# Patient Record
Sex: MALE | Race: WHITE | Employment: FULL TIME | URBAN - NONMETROPOLITAN AREA
[De-identification: names, ages, dates, MRNs, and addresses within clinical notes are randomized per-mention and may not be internally consistent; named-entity substitution may affect disease eponyms.]

---

## 2020-03-06 ENCOUNTER — OFFICE VISIT (OUTPATIENT)
Dept: FAMILY MEDICINE CLINIC | Age: 55
End: 2020-03-06
Payer: COMMERCIAL

## 2020-03-06 VITALS
SYSTOLIC BLOOD PRESSURE: 136 MMHG | DIASTOLIC BLOOD PRESSURE: 86 MMHG | WEIGHT: 254 LBS | OXYGEN SATURATION: 98 % | HEIGHT: 72 IN | HEART RATE: 80 BPM | BODY MASS INDEX: 34.4 KG/M2

## 2020-03-06 PROCEDURE — 99203 OFFICE O/P NEW LOW 30 MIN: CPT | Performed by: FAMILY MEDICINE

## 2020-03-06 RX ORDER — MELOXICAM 15 MG/1
15 TABLET ORAL DAILY
COMMUNITY
End: 2020-10-03 | Stop reason: SDUPTHER

## 2020-03-06 RX ORDER — LANOLIN ALCOHOL/MO/W.PET/CERES
1000 CREAM (GRAM) TOPICAL DAILY
COMMUNITY
End: 2022-03-17

## 2020-03-06 RX ORDER — MULTIVIT-MIN/IRON/FOLIC ACID/K 18-600-40
CAPSULE ORAL
COMMUNITY
End: 2020-03-06 | Stop reason: ALTCHOICE

## 2020-03-06 ASSESSMENT — PATIENT HEALTH QUESTIONNAIRE - PHQ9
SUM OF ALL RESPONSES TO PHQ QUESTIONS 1-9: 0
1. LITTLE INTEREST OR PLEASURE IN DOING THINGS: 0
2. FEELING DOWN, DEPRESSED OR HOPELESS: 0
SUM OF ALL RESPONSES TO PHQ9 QUESTIONS 1 & 2: 0
SUM OF ALL RESPONSES TO PHQ QUESTIONS 1-9: 0

## 2020-03-06 ASSESSMENT — ENCOUNTER SYMPTOMS
RHINORRHEA: 0
VOMITING: 0
RECTAL PAIN: 1
DIARRHEA: 0
COUGH: 0
COLOR CHANGE: 0
SHORTNESS OF BREATH: 0
NAUSEA: 0
CONSTIPATION: 0
EYE REDNESS: 0

## 2020-03-06 NOTE — PROGRESS NOTES
Liyah Arenas at [facility] called stating that they cannot find the Lidocaine Hydrocortisone 1 1% cream. Neither can [facility].   approved switching to the Proctazone cream.

## 2020-03-06 NOTE — PROGRESS NOTES
[address] [place] [address] Rd, [address], [place]  Phone:  [phone]  Fax:  [phone]         Name: Katie Lakhani  : 1965         Chief Complaint:     Chief Complaint   Patient presents with    New Patient     hemorrhiods    seen Dr Christian Torres        History of Present Illness:     Perfecto Grigsby is a 52 yo male who presents today to establish as a new patient for evaluation of rectal discomfort. Earlier this week he was doing crunches while on his inversion table and after he felt rectal pain. He has a history of internal hemorrhoids his last colonoscopy was in  with Dr. Poli Kendall. His bowels have been moving normally this week. No blood in or on his stools. No abdominal pain, nausea, vomiting, or diarrhea. Past Medical History:     Past Medical History:   Diagnosis Date    Chronic back pain     Internal hemorrhoids 2015    per colonoscopy        Past Surgical History:     Past Surgical History:   Procedure Laterality Date    CERVICAL FUSION      SKULL FRACTURE ELEVATION  1997    plate placed        Family History:     Family History   Problem Relation Age of Onset    Heart Attack Father     Heart Disease Father        Social History:     Social:    Social History     Socioeconomic History    Marital status:      Spouse name: Davi Neri Number of children: Not on file    Years of education: Not on file    Highest education level: Not on file   Occupational History    Not on file   Social Needs    Financial resource strain: Not on file    Food insecurity:     Worry: Not on file     Inability: Not on file    Transportation needs:     Medical: Not on file     Non-medical: Not on file   Tobacco Use    Smoking status: Former Smoker     Last attempt to quit: 2/15/1994     Years since quittin.0    Smokeless tobacco: Never Used   Substance and Sexual Activity    Alcohol use:  Yes     Alcohol/week: 2.0 standard drinks     Types: 2 Cans of diagnosis and/or treatment. Thus, any decision to defer recommended testing is done so at Eliana Sincere own risk. I would like to see Katie Lakhani back in Return in about 1 year (around 3/6/2021) for well/preventative visit. or sooner if any new issues occur.     Electronically signed by Charity Tracey MD on 3/6/2020 at 2:17 PM

## 2020-03-06 NOTE — PATIENT INSTRUCTIONS
and have to limit fluids, talk with your doctor before you increase the amount of fluids you drink. · Use a stool softener that contains bran or psyllium. You can save money by buying bran or psyllium (available in bulk at most health food stores) and sprinkling it on foods or stirring it into fruit juice. Or you can use a product such as Metamucil or Hydrocil. · Practice healthy bowel habits. ? Go to the bathroom as soon as you have the urge. ? Avoid straining to pass stools. Relax and give yourself time to let things happen naturally. ? Do not hold your breath while passing stools. ? Do not read while sitting on the toilet. Get off the toilet as soon as you have finished. · Take your medicines exactly as prescribed. Call your doctor if you think you are having a problem with your medicine. When should you call for help? Call 911 anytime you think you may need emergency care. For example, call if:    · You pass maroon or very bloody stools.    Call your doctor now or seek immediate medical care if:    · You have increased pain.     · You have increased bleeding.    Watch closely for changes in your health, and be sure to contact your doctor if:    · Your symptoms have not improved after 3 or 4 days. Where can you learn more? Go to https://Splick.it.[employer]. org and sign in to your [facility] account. Enter X440 in the [facility] box to learn more about \"Hemorrhoids: Care Instructions. \"     If you do not have an account, please click on the \"Sign Up Now\" link. Current as of: August 11, 2019  Content Version: 12.3  © 5368-7436 Healthwise, Incorporated. Care instructions adapted under license by [facility] ([facility]). If you have questions about a medical condition or this instruction, always ask your healthcare professional. [address] any warranty or liability for your use of this information.

## 2020-09-08 ENCOUNTER — TELEPHONE (OUTPATIENT)
Dept: FAMILY MEDICINE CLINIC | Age: 55
End: 2020-09-08

## 2020-09-10 LAB — SARS-COV-2: NOT DETECTED

## 2020-09-11 ENCOUNTER — TELEPHONE (OUTPATIENT)
Dept: FAMILY MEDICINE CLINIC | Age: 55
End: 2020-09-11

## 2020-09-11 NOTE — TELEPHONE ENCOUNTER
----- Message from Holley Rachel MD sent at 9/11/2020  8:08 AM EDT -----  COVID test was negative. Because of his wife's positive test, he should still quarantine for 14 days as he still may develop the virus.

## 2020-09-14 ENCOUNTER — TELEPHONE (OUTPATIENT)
Dept: FAMILY MEDICINE CLINIC | Age: 55
End: 2020-09-14

## 2020-09-14 NOTE — TELEPHONE ENCOUNTER
Patient called in and stated wife was DX covid on 09/30/2020. He was told to quarantine for 14 days. He tested negative and still no symptoms wife is better. He is asking if he can get a note to go back on 09/17/2020. And also asking if he needs to call Wednesday with updates on if he is having any symptoms. We are to call Francisco Hunt back.

## 2020-09-14 NOTE — TELEPHONE ENCOUNTER
I saw Zeny Martinez on 9/4/2020 and she reported her symptoms began 2 days prior. The end of her 10 day quarantine would be 9/12. Because she could spread the virus the entire 10 days and he was around her the whole time, his 14 day quarantine started on 9/12 so he should remain out of work until then as he can actually test positive any time bw 2-14 days.

## 2020-09-14 NOTE — TELEPHONE ENCOUNTER
Spoke with Vaishnavi Hall- directive given. Upset that he should still have to quarantine because he states that he has been only been with her since she started quarantining before labor day and that if he was going to get it he would have gotten it by now. Does not feel that his start date for his 14 day quarantine should have been later than that. Tried to explain that he could test positive as Dr. Kathryn Martin advised but he would not listen to me.  Please advise

## 2020-10-02 ENCOUNTER — TELEPHONE (OUTPATIENT)
Dept: FAMILY MEDICINE CLINIC | Age: 55
End: 2020-10-02

## 2020-10-03 RX ORDER — MELOXICAM 15 MG/1
15 TABLET ORAL DAILY
Qty: 90 TABLET | Refills: 1 | Status: SHIPPED | OUTPATIENT
Start: 2020-10-03 | End: 2021-09-12 | Stop reason: SDUPTHER

## 2021-05-06 ENCOUNTER — TELEPHONE (OUTPATIENT)
Dept: FAMILY MEDICINE CLINIC | Age: 56
End: 2021-05-06

## 2021-05-06 DIAGNOSIS — Z00.00 WELLNESS EXAMINATION: Primary | ICD-10-CM

## 2021-05-06 NOTE — TELEPHONE ENCOUNTER
----- Message from Stefan Burkitt sent at 5/6/2021 10:26 AM EDT -----  Subject: Message to Provider    QUESTIONS  Information for Provider? Patient is calling to request bloodwork prior to   patients appt on 5/18/21 . .. so he and dr. Glenroy Roland can go over them if   possible . . please advise   ---------------------------------------------------------------------------  --------------  CALL BACK INFO  What is the best way for the office to contact you? OK to leave message on   voicemail  Preferred Call Back Phone Number? [phone]  ---------------------------------------------------------------------------  --------------  SCRIPT ANSWERS  Relationship to Patient?  Self

## 2021-05-18 ENCOUNTER — NURSE ONLY (OUTPATIENT)
Dept: LAB | Age: 56
End: 2021-05-18

## 2021-05-18 ENCOUNTER — TELEPHONE (OUTPATIENT)
Dept: FAMILY MEDICINE CLINIC | Age: 56
End: 2021-05-18

## 2021-05-18 DIAGNOSIS — Z00.00 WELLNESS EXAMINATION: ICD-10-CM

## 2021-05-18 LAB
ANION GAP SERPL CALCULATED.3IONS-SCNC: 8 MEQ/L (ref 8–16)
BASOPHILS # BLD: 0.5 %
BASOPHILS ABSOLUTE: 0 THOU/MM3 (ref 0–0.1)
BUN BLDV-MCNC: 15 MG/DL (ref 7–22)
CALCIUM SERPL-MCNC: 10 MG/DL (ref 8.5–10.5)
CHLORIDE BLD-SCNC: 102 MEQ/L (ref 98–111)
CHOLESTEROL, TOTAL: 188 MG/DL (ref 100–199)
CO2: 29 MEQ/L (ref 23–33)
CREAT SERPL-MCNC: 0.9 MG/DL (ref 0.4–1.2)
EOSINOPHIL # BLD: 5.9 %
EOSINOPHILS ABSOLUTE: 0.3 THOU/MM3 (ref 0–0.4)
ERYTHROCYTE [DISTWIDTH] IN BLOOD BY AUTOMATED COUNT: 14.8 % (ref 11.5–14.5)
ERYTHROCYTE [DISTWIDTH] IN BLOOD BY AUTOMATED COUNT: 47 FL (ref 35–45)
GFR SERPL CREATININE-BSD FRML MDRD: 87 ML/MIN/1.73M2
GLUCOSE BLD-MCNC: 106 MG/DL (ref 70–108)
HCT VFR BLD CALC: 48.8 % (ref 42–52)
HDLC SERPL-MCNC: 55 MG/DL
HEMOGLOBIN: 15.9 GM/DL (ref 14–18)
IMMATURE GRANS (ABS): 0.02 THOU/MM3 (ref 0–0.07)
IMMATURE GRANULOCYTES: 0.3 %
LDL CHOLESTEROL CALCULATED: 112 MG/DL
LYMPHOCYTES # BLD: 21.1 %
LYMPHOCYTES ABSOLUTE: 1.2 THOU/MM3 (ref 1–4.8)
MCH RBC QN AUTO: 28.2 PG (ref 26–33)
MCHC RBC AUTO-ENTMCNC: 32.6 GM/DL (ref 32.2–35.5)
MCV RBC AUTO: 86.5 FL (ref 80–94)
MONOCYTES # BLD: 16.6 %
MONOCYTES ABSOLUTE: 1 THOU/MM3 (ref 0.4–1.3)
NUCLEATED RED BLOOD CELLS: 0 /100 WBC
PLATELET # BLD: 206 THOU/MM3 (ref 130–400)
PMV BLD AUTO: 9.5 FL (ref 9.4–12.4)
POTASSIUM SERPL-SCNC: 4.8 MEQ/L (ref 3.5–5.2)
PROSTATE SPECIFIC ANTIGEN: 0.46 NG/ML (ref 0–1)
RBC # BLD: 5.64 MILL/MM3 (ref 4.7–6.1)
SEG NEUTROPHILS: 55.6 %
SEGMENTED NEUTROPHILS ABSOLUTE COUNT: 3.3 THOU/MM3 (ref 1.8–7.7)
SODIUM BLD-SCNC: 139 MEQ/L (ref 135–145)
TRIGL SERPL-MCNC: 103 MG/DL (ref 0–199)
WBC # BLD: 5.9 THOU/MM3 (ref 4.8–10.8)

## 2021-05-20 ASSESSMENT — ENCOUNTER SYMPTOMS
EYE REDNESS: 0
COLOR CHANGE: 0
RHINORRHEA: 0
VOMITING: 0
SHORTNESS OF BREATH: 0
COUGH: 0
SORE THROAT: 0
NAUSEA: 0

## 2021-05-20 NOTE — PROGRESS NOTES
[address] [place] [address] Rd, [address], [place]  Phone:  [phone]  Fax:  [phone]      Marivel Díazdavonte       Name: Valdez Cortez  : 1965          Chief Complaint:     Chief Complaint   Patient presents with   [employer] [facility]     see labs done       History of Present Illness:      Valdez Cortez is a 61 y.o.  male who presents today for a health maintenance examination. He has been doing well overall. When he had his right knee replacement in January he was on Celebrex and it helped more than his Mobic. Lab Results   Component Value Date    CHOL 188 2021    TRIG 103 2021    HDL 55 2021    LDLCALC 112 2021     Lab Results   Component Value Date    ALT 34 2013    AST 34 2013        Lab Results   Component Value Date     2021    K 4.8 2021     2021    CO2 29 2021    BUN 15 2021    CREATININE 0.9 2021    GLUCOSE 106 2021    CALCIUM 10.0 2021        Health Maintenance  Smoker?:  No  Healthy diet?:  No  Routine exercise?:  Yes  Routine dental exams?:  Yes  Routine eye exams?:  No  Last colon cancer screening:  Colonoscopy with Dr. Chio Dawn       Past Medical History:     Past Medical History:   Diagnosis Date    Chronic back pain     Internal hemorrhoids 2015    per colonoscopy        Past Surgical History:     Past Surgical History:   Procedure Laterality Date    CERVICAL FUSION      SKULL FRACTURE ELEVATION      plate placed        Medications:       Outpatient Medications Prior to Visit   Medication Sig Dispense Refill    Coenzyme Q10 (CO Q 10) 100 MG CAPS Take by mouth      vitamin B-12 (CYANOCOBALAMIN) 1000 MCG tablet Take 1,000 mcg by mouth daily      Multiple Vitamin (MULTI VITAMIN MENS PO) Take  by mouth.  Omega-3 Fatty Acids (FISH OIL) 1200 MG CAPS Take  by mouth daily.         meloxicam (MOBIC) 15 MG tablet Take 1 tablet by mouth daily 90 tablet 1    GLUCOSA-CHONDR-NA CHONDR-MSM PO Take by mouth      hydrocortisone (PROCTOZONE-HC) 2.5 % rectal cream Place rectally 3 times daily as needed for Hemorrhoids       No facility-administered medications prior to visit. Allergies:       Patient has no known allergies. Social History:     Social:          Social History     Socioeconomic History    Marital status:      Spouse name: Elvia Franks Number of children: Not on file    Years of education: Not on file    Highest education level: Not on file   Occupational History    Not on file   Tobacco Use    Smoking status: Former Smoker     Quit date: 2/15/1994     Years since quittin.2    Smokeless tobacco: Never Used   Substance and Sexual Activity    Alcohol use: Yes     Alcohol/week: 2.0 standard drinks     Types: 2 Cans of beer per week     Comment: social    Drug use: No    Sexual activity: Yes     Partners: Female   Other Topics Concern    Not on file   Social History Narrative    Not on file     Social Determinants of Health     Financial Resource Strain:     Difficulty of Paying Living Expenses:    Food Insecurity:     Worried About Running Out of Food in the Last Year:     [address] in the Last Year:    Transportation Needs:     Lack of Transportation (Medical):      Lack of Transportation (Non-Medical):    Physical Activity:     Days of Exercise per Week:     Minutes of Exercise per Session:    Stress:     Feeling of Stress :    Social Connections:     Frequency of Communication with Friends and Family:     Frequency of Social Gatherings with Friends and Family:     Attends Orthodoxy Services:     Active Member of Clubs or Organizations:     Attends Club or Organization Meetings:     Marital Status:    Intimate Partner Violence:     Fear of Current or Ex-Partner:     Emotionally Abused:     Physically Abused:     Sexually Abused:        Family History:     Family History   Problem Relation Age of Onset    Heart Attack Father     Heart Disease Father        Review of Systems:     Review of Systems   Constitutional: Negative for chills and fever. HENT: Negative for rhinorrhea and sore throat. Eyes: Negative for redness and visual disturbance. Respiratory: Negative for cough and shortness of breath. Cardiovascular: Negative for chest pain and palpitations. Gastrointestinal: Negative for nausea and vomiting. Musculoskeletal: Negative for arthralgias and myalgias. Skin: Negative for color change and rash. Neurological: Negative for syncope and headaches. Psychiatric/Behavioral: Negative for dysphoric mood. The patient is not nervous/anxious. Physical Exam:     Vitals:  Blood pressure 132/64, pulse 74, temperature 98.5 °F (36.9 °C), height 6' (1.829 m), weight 257 lb (116.6 kg), SpO2 99 %. General appearance:  Alert, well appearing, and in no acute distress. Mental status:  Oriented to person, place, and time with normal affect. Head:  Normocephalic and atraumatic. Eyes:   Extraocular eye movements intact, conjunctiva clear. Ears:  Hearing appears to be intact. Nose:  No drainage noted. Mouth:  Mucous membranes moist.  Neck:  Supple, no carotid bruits, thyroid not palpable. Heart:  Normal rate, regular rhythm, no murmurs. Lungs:  Clear to auscultation bilaterally. Respirations unlabored. Abdomen:  Soft, nondistended, bowel sounds present all four quadrants. Neurological:  Normal speech. Extremities:  Peripheral pulses palpable, no pedal edema. Skin:  No gross lesions, rashes, or induration noted. Assessment:      Diagnosis Orders   1. Wellness examination     2.  Primary osteoarthritis involving multiple joints  celecoxib (CELEBREX) 100 MG capsule       Plan:     Orders Placed This Encounter   Medications    celecoxib (CELEBREX) 100 MG capsule     Sig: Take 1 capsule by mouth daily     Dispense:  90 capsule     Refill:  1     Counseling Completed:  Tobacco and secondary smoke risks reviewed; instructed on cessation and avoidance. Colon cancer screening reviewed age > 48, or < if indicated. Labs ordered, if indicated. Influenza vaccine recommended, if in season. Pneumonia vaccination if > 65 or indicated. Routine eye and dental exams advised. Exercise and healthy diet discussed. Return in about 1 year (around 5/24/2022) for well/preventative visit.     Electronically signed by Gracia Gruber MD on 5/24/2021 at 3:27 PM

## 2021-05-24 ENCOUNTER — OFFICE VISIT (OUTPATIENT)
Dept: FAMILY MEDICINE CLINIC | Age: 56
End: 2021-05-24
Payer: COMMERCIAL

## 2021-05-24 VITALS
BODY MASS INDEX: 34.81 KG/M2 | HEIGHT: 72 IN | SYSTOLIC BLOOD PRESSURE: 132 MMHG | WEIGHT: 257 LBS | OXYGEN SATURATION: 99 % | HEART RATE: 74 BPM | TEMPERATURE: 98.5 F | DIASTOLIC BLOOD PRESSURE: 64 MMHG

## 2021-05-24 DIAGNOSIS — Z00.00 WELLNESS EXAMINATION: Primary | ICD-10-CM

## 2021-05-24 DIAGNOSIS — M15.9 PRIMARY OSTEOARTHRITIS INVOLVING MULTIPLE JOINTS: ICD-10-CM

## 2021-05-24 PROCEDURE — 99396 PREV VISIT EST AGE 40-64: CPT | Performed by: FAMILY MEDICINE

## 2021-05-24 RX ORDER — CELECOXIB 100 MG/1
100 CAPSULE ORAL DAILY
Qty: 90 CAPSULE | Refills: 1 | Status: SHIPPED | OUTPATIENT
Start: 2021-05-24 | End: 2021-12-26

## 2021-05-24 ASSESSMENT — PATIENT HEALTH QUESTIONNAIRE - PHQ9
SUM OF ALL RESPONSES TO PHQ QUESTIONS 1-9: 0
2. FEELING DOWN, DEPRESSED OR HOPELESS: 0

## 2021-09-10 NOTE — TELEPHONE ENCOUNTER
----- Message from Arleth Rinaldi sent at 9/10/2021  4:05 PM EDT -----  Subject: Refill Request    QUESTIONS  Name of Medication? Other - meloxicam 15mg  Patient-reported dosage and instructions? once daily  How many days do you have left? 7  Preferred Pharmacy? [facility] phone number (if available)? [phone]  ---------------------------------------------------------------------------  --------------  CALL BACK INFO  What is the best way for the office to contact you? OK to leave message on   voicemail  Preferred Call Back Phone Number?  [phone]

## 2021-09-10 NOTE — TELEPHONE ENCOUNTER
Parth Dukes called requesting a refill on the following medications:  Requested Prescriptions     Pending Prescriptions Disp Refills    meloxicam (MOBIC) 15 MG tablet 90 tablet 1     Sig: Take 1 tablet by mouth daily       Date of last visit: 5/24/2021  Date of next visit (if applicable):Visit date not found  Date of last refill: 10/03/20  Pharmacy Name: [facility] LPN

## 2021-09-12 RX ORDER — MELOXICAM 15 MG/1
15 TABLET ORAL DAILY
Qty: 90 TABLET | Refills: 1 | Status: SHIPPED | OUTPATIENT
Start: 2021-09-12 | End: 2022-03-29

## 2021-10-09 LAB
ALBUMIN SERPL-MCNC: 4.6 G/DL
ALP BLD-CCNC: 56 U/L
ALT SERPL-CCNC: 32 U/L
ANION GAP SERPL CALCULATED.3IONS-SCNC: 2.1 MMOL/L
AST SERPL-CCNC: 24 U/L
BASOPHILS ABSOLUTE: NORMAL
BASOPHILS RELATIVE PERCENT: 0.5 %
BILIRUB SERPL-MCNC: 0.9 MG/DL (ref 0.1–1.4)
BILIRUBIN DIRECT: 0.2 MG/DL
BUN BLDV-MCNC: 23 MG/DL
CALCIUM SERPL-MCNC: 9.2 MG/DL
CHLORIDE BLD-SCNC: 102 MMOL/L
CHOLESTEROL, TOTAL: 190 MG/DL
CHOLESTEROL/HDL RATIO: NORMAL
CO2: 29 MMOL/L
CREAT SERPL-MCNC: 0.9 MG/DL
EOSINOPHILS ABSOLUTE: 0.2 /ΜL
EOSINOPHILS RELATIVE PERCENT: 3.7 %
GFR CALCULATED: 87
GLUCOSE BLD-MCNC: 97 MG/DL
HCT VFR BLD CALC: 47.9 % (ref 41–53)
HDLC SERPL-MCNC: 48 MG/DL (ref 35–70)
HEMOGLOBIN: 16.1 G/DL (ref 13.5–17.5)
LDL CHOLESTEROL CALCULATED: 126 MG/DL (ref 0–160)
LYMPHOCYTES ABSOLUTE: 1.2 /ΜL
LYMPHOCYTES RELATIVE PERCENT: 22.3 %
MCH RBC QN AUTO: 29.9 PG
MCHC RBC AUTO-ENTMCNC: 33.6 G/DL
MCV RBC AUTO: 89.2 FL
MONOCYTES ABSOLUTE: 0.8 /ΜL
MONOCYTES RELATIVE PERCENT: 16.1 %
NEUTROPHILS ABSOLUTE: 3 /ΜL
NEUTROPHILS RELATIVE PERCENT: 57.4 %
NONHDLC SERPL-MCNC: NORMAL MG/DL
PHOSPHORUS: 2.8 MG/DL
PLATELET # BLD: 219 K/ΜL
PMV BLD AUTO: 8.2 FL
POTASSIUM SERPL-SCNC: 4.5 MMOL/L
PROSTATE SPECIFIC ANTIGEN: 0.22 NG/ML
RBC # BLD: 5.38 10^6/ΜL
SODIUM BLD-SCNC: 136 MMOL/L
TOTAL PROTEIN: 6.8
TRIGL SERPL-MCNC: 80 MG/DL
VLDLC SERPL CALC-MCNC: 16 MG/DL
WBC # BLD: 5.2 10^3/ML

## 2021-12-26 ENCOUNTER — APPOINTMENT (OUTPATIENT)
Dept: GENERAL RADIOLOGY | Age: 56
End: 2021-12-26
Payer: COMMERCIAL

## 2021-12-26 ENCOUNTER — HOSPITAL ENCOUNTER (EMERGENCY)
Age: 56
Discharge: HOME | End: 2021-12-26
Attending: EMERGENCY MEDICINE
Payer: COMMERCIAL

## 2021-12-26 VITALS
DIASTOLIC BLOOD PRESSURE: 85 MMHG | BODY MASS INDEX: 34.54 KG/M2 | RESPIRATION RATE: 17 BRPM | TEMPERATURE: 98 F | WEIGHT: 255 LBS | SYSTOLIC BLOOD PRESSURE: 155 MMHG | OXYGEN SATURATION: 100 % | HEIGHT: 72 IN | HEART RATE: 57 BPM

## 2021-12-26 DIAGNOSIS — I10 UNCONTROLLED HYPERTENSION: Primary | ICD-10-CM

## 2021-12-26 LAB
ALBUMIN SERPL-MCNC: 4 GM/DL (ref 3.4–5)
ALP BLD-CCNC: 62 U/L (ref 46–116)
ALT SERPL-CCNC: 49 U/L (ref 14–63)
ANION GAP: 8 MEQ/L (ref 8–16)
AST SERPL-CCNC: 31 U/L (ref 15–37)
BILIRUB SERPL-MCNC: 0.4 MG/DL (ref 0.2–1)
BUN BLDV-MCNC: 16 MG/DL (ref 7–18)
CHLORIDE BLD-SCNC: 104 MEQ/L (ref 98–107)
CO2: 30 MEQ/L (ref 21–32)
CREAT SERPL-MCNC: 1 MG/DL (ref 0.6–1.3)
DIFFERENTIAL, MANUAL: NORMAL
EOSINOPHILS RELATIVE PERCENT: 1 % (ref 0–4)
GFR, ESTIMATED: 82 ML/MIN/1.73M2
GLUCOSE BLD-MCNC: 98 MG/DL (ref 74–106)
HCT VFR BLD CALC: 45.8 % (ref 42–52)
HEMOGLOBIN: 14.9 GM/DL (ref 14–18)
LYMPHOCYTES # BLD: 30 % (ref 15–47)
MAGNESIUM: 2.1 MG/DL (ref 1.8–2.4)
MCH RBC QN AUTO: 30.8 PG (ref 27–31)
MCHC RBC AUTO-ENTMCNC: 32.5 GM/DL (ref 33–37)
MCV RBC AUTO: 94.9 FL (ref 80–94)
MONOCYTES: 8 % (ref 0–12)
PDW BLD-RTO: 12.3 % (ref 11.5–14.5)
PLATELET # BLD: 264 THOU/MM3 (ref 130–400)
PLATELET ESTIMATE: ADEQUATE
PMV BLD AUTO: 6.9 FL (ref 7.4–10.4)
POC CALCIUM: 8.8 MG/DL (ref 8.5–10.1)
POTASSIUM SERPL-SCNC: 4.5 MEQ/L (ref 3.5–5.1)
RBC # BLD: 4.83 MILL/MM3 (ref 4.7–6.1)
SEGS: 61 % (ref 43–75)
SODIUM BLD-SCNC: 142 MEQ/L (ref 136–145)
TOTAL PROTEIN: 7.3 GM/DL (ref 6.4–8.2)
TROPONIN I: < 0.017 NG/ML (ref 0.02–0.06)
WBC # BLD: 7.8 THOU/MM3 (ref 4.8–10.8)

## 2021-12-26 PROCEDURE — 6370000000 HC RX 637 (ALT 250 FOR IP): Performed by: EMERGENCY MEDICINE

## 2021-12-26 PROCEDURE — 99283 EMERGENCY DEPT VISIT LOW MDM: CPT

## 2021-12-26 PROCEDURE — 80053 COMPREHEN METABOLIC PANEL: CPT

## 2021-12-26 PROCEDURE — 93005 ELECTROCARDIOGRAM TRACING: CPT | Performed by: EMERGENCY MEDICINE

## 2021-12-26 PROCEDURE — 84484 ASSAY OF TROPONIN QUANT: CPT

## 2021-12-26 PROCEDURE — 71045 X-RAY EXAM CHEST 1 VIEW: CPT

## 2021-12-26 PROCEDURE — 83735 ASSAY OF MAGNESIUM: CPT

## 2021-12-26 PROCEDURE — 85025 COMPLETE CBC W/AUTO DIFF WBC: CPT

## 2021-12-26 RX ORDER — LISINOPRIL 20 MG/1
20 TABLET ORAL DAILY
Qty: 30 TABLET | Refills: 0 | Status: SHIPPED | OUTPATIENT
Start: 2021-12-26 | End: 2022-01-27 | Stop reason: SDUPTHER

## 2021-12-26 RX ORDER — CLONIDINE HYDROCHLORIDE 0.1 MG/1
0.1 TABLET ORAL ONCE
Status: COMPLETED | OUTPATIENT
Start: 2021-12-26 | End: 2021-12-26

## 2021-12-26 RX ADMIN — CLONIDINE HYDROCHLORIDE 0.1 MG: 0.1 TABLET ORAL at 19:20

## 2021-12-26 NOTE — ED TRIAGE NOTES
Pt with high BP since Friday. Denies HA, chest pain. Yet yesterday had some chest pressure. Now with tingling in left arm.

## 2021-12-27 ENCOUNTER — TELEPHONE (OUTPATIENT)
Dept: FAMILY MEDICINE CLINIC | Age: 56
End: 2021-12-27

## 2021-12-27 LAB
EKG ATRIAL RATE: 57 BPM
EKG P AXIS: 28 DEGREES
EKG P-R INTERVAL: 154 MS
EKG Q-T INTERVAL: 424 MS
EKG QRS DURATION: 86 MS
EKG QTC CALCULATION (BAZETT): 412 MS
EKG R AXIS: 14 DEGREES
EKG T AXIS: 19 DEGREES
EKG VENTRICULAR RATE: 57 BPM

## 2021-12-27 PROCEDURE — 93010 ELECTROCARDIOGRAM REPORT: CPT | Performed by: INTERNAL MEDICINE

## 2021-12-27 ASSESSMENT — ENCOUNTER SYMPTOMS
NAUSEA: 0
WHEEZING: 0
SORE THROAT: 0
DIARRHEA: 0
BACK PAIN: 1
BLURRED VISION: 0
COUGH: 0

## 2021-12-27 NOTE — ED PROVIDER NOTES
[facility]  eMERGENCY dEPARTMENT eNCOUnter             [address] COMPLAINT    Chief Complaint   Patient presents with    Hypertension     HTN x2 days. Nurses Notes reviewed and I agree except as noted in the HPI. HPI    Jalen Hamilton is a 61 y.o. male who presents for evaluation of high blood pressure. He has been seeing a chiropractor for low back pain, and was told for the last two days that his BP was high. He denies history of hypertension, and is on no medication for it. He has recently been eating more sugar, salt, and drinking more alcohol. He has also taken some \"keto\" diet pills. No headache, chest pain, occasional dyspnea. REVIEW OF SYSTEMS      Review of Systems   Constitutional: Negative for fever and malaise/fatigue. HENT: Negative for congestion and sore throat. Eyes: Negative for blurred vision. Respiratory: Negative for cough and wheezing. Cardiovascular: Negative for chest pain and palpitations. Gastrointestinal: Negative for diarrhea and nausea. Genitourinary: Negative for dysuria. Musculoskeletal: Positive for back pain. Neurological: Negative for dizziness, focal weakness and headaches. All other systems reviewed and are negative. PAST MEDICAL HISTORY     has a past medical history of Chronic back pain and Internal hemorrhoids. SURGICAL HISTORY     has a past surgical history that includes Skull fracture elevation (1997); cervical fusion; and joint replacement (Right, 01/2021).     CURRENT MEDICATIONS    Discharge Medication List as of 12/26/2021  8:27 PM      CONTINUE these medications which have NOT CHANGED    Details   Digestive Enzymes (DIGESTIVE ENZYME PO) Take by mouth dailyHistorical Med      meloxicam (MOBIC) 15 MG tablet Take 1 tablet by mouth daily, Disp-90 tablet, R-1Normal      Coenzyme Q10 (CO Q 10) 100 MG CAPS Take by mouthHistorical Med      vitamin B-12 (CYANOCOBALAMIN) 1000 MCG tablet Take 1,000 mcg by mouth dailyHistorical Med      Multiple Vitamin (MULTI VITAMIN MENS PO) Take  by mouth. Historical Med      Omega-3 Fatty Acids (FISH OIL) 1200 MG CAPS Take  by mouth daily. Historical Med             ALLERGIES    has No Known Allergies. FAMILY HISTORY    He indicated that his mother is . He indicated that his father is . He indicated that his sister is alive. He indicated that his brother is alive. family history includes Heart Attack in his father; Heart Disease in his father. SOCIAL HISTORY     reports that he quit smoking about 27 years ago. He has never used smokeless tobacco. He reports current alcohol use of about 2.0 standard drinks of alcohol per week. He reports that he does not use drugs. PHYSICAL EXAM       INITIAL VITALS: BP (!) 155/85   Pulse 57   Temp 98 °F (36.7 °C)   Resp 17   Ht 6' (1.829 m)   Wt 255 lb (115.7 kg)   SpO2 100%   BMI 34.58 kg/m²      Physical Exam  Vitals and nursing note reviewed. Constitutional:       General: He is not in acute distress. Appearance: He is not toxic-appearing. HENT:      Nose: Nose normal.      Mouth/Throat:      Mouth: Mucous membranes are moist.      Pharynx: No oropharyngeal exudate or posterior oropharyngeal erythema. Eyes:      Pupils: Pupils are equal, round, and reactive to light. Cardiovascular:      Rate and Rhythm: Normal rate and regular rhythm. Heart sounds: No murmur heard. Pulmonary:      Effort: Pulmonary effort is normal. No respiratory distress. Breath sounds: Normal breath sounds. No wheezing. Abdominal:      General: Bowel sounds are normal.      Palpations: Abdomen is soft. There is no mass. Tenderness: There is no abdominal tenderness. Musculoskeletal:         General: No swelling or tenderness. Cervical back: Neck supple. Lymphadenopathy:      Cervical: No cervical adenopathy. Skin:     General: Skin is warm and dry.    Neurological:

## 2021-12-27 NOTE — ED NOTES
Discharge teaching and instructions for condition explained to patient by Tatum Vega RN. AVS reviewed. Went over prescriptions with patient. Patient voiced understanding regarding prescriptions, follow up appointments and care of self at home. Pt discharged to home in stable condition per self.        Frank Ni RN  12/26/21 2035

## 2022-01-27 ENCOUNTER — OFFICE VISIT (OUTPATIENT)
Dept: FAMILY MEDICINE CLINIC | Age: 57
End: 2022-01-27
Payer: COMMERCIAL

## 2022-01-27 VITALS
HEART RATE: 62 BPM | WEIGHT: 250 LBS | RESPIRATION RATE: 17 BRPM | BODY MASS INDEX: 33.86 KG/M2 | DIASTOLIC BLOOD PRESSURE: 90 MMHG | SYSTOLIC BLOOD PRESSURE: 151 MMHG | HEIGHT: 72 IN | TEMPERATURE: 97.7 F | OXYGEN SATURATION: 94 %

## 2022-01-27 DIAGNOSIS — I10 ESSENTIAL HYPERTENSION: Primary | ICD-10-CM

## 2022-01-27 PROCEDURE — 99213 OFFICE O/P EST LOW 20 MIN: CPT | Performed by: FAMILY MEDICINE

## 2022-01-27 RX ORDER — LISINOPRIL 20 MG/1
20 TABLET ORAL DAILY
Qty: 90 TABLET | Refills: 1 | Status: SHIPPED | OUTPATIENT
Start: 2022-01-27 | End: 2022-02-18 | Stop reason: SINTOL

## 2022-01-27 SDOH — ECONOMIC STABILITY: FOOD INSECURITY: WITHIN THE PAST 12 MONTHS, THE FOOD YOU BOUGHT JUST DIDN'T LAST AND YOU DIDN'T HAVE MONEY TO GET MORE.: NEVER TRUE

## 2022-01-27 SDOH — ECONOMIC STABILITY: FOOD INSECURITY: WITHIN THE PAST 12 MONTHS, YOU WORRIED THAT YOUR FOOD WOULD RUN OUT BEFORE YOU GOT MONEY TO BUY MORE.: NEVER TRUE

## 2022-01-27 ASSESSMENT — ENCOUNTER SYMPTOMS
SHORTNESS OF BREATH: 0
NAUSEA: 0
VOMITING: 0
COUGH: 0

## 2022-01-27 ASSESSMENT — SOCIAL DETERMINANTS OF HEALTH (SDOH): HOW HARD IS IT FOR YOU TO PAY FOR THE VERY BASICS LIKE FOOD, HOUSING, MEDICAL CARE, AND HEATING?: NOT HARD AT ALL

## 2022-01-27 NOTE — PROGRESS NOTES
[address]  [address] [place] [address]  Phone:  [phone]          Name: Jacoby Babin  : 1965    Chief Complaint   Patient presents with    Follow-Up from Hospital       HPI:     Jacoby Babin is a 61 y.o. male who presents today for follow up of elevated blood pressure. He was seen in the ER on 21 with elevated BP. He had been at the chiropractor the preceding 2 days and was told his BP was elevated (200/120). His EKG was sinus bradycardia, otherwise WNL. Labs were overall unremarkable. He was discharged with an Rx for Lisinopril 20 mg daily. It caused a tickle in his throat. He hasn't had it in 2 days. BP at home has ranged from 120//89. He wants to get off his BP medication so is working on eating healthier, eating less, and drinking less alcohol. He's already dropped about 10 lbs. He'd like to get down to 230 lbs. Current Outpatient Medications:     Cholecalciferol (D3 PO), Take by mouth, Disp: , Rfl:     ZINC PO, Take by mouth daily, Disp: , Rfl:     lisinopril (PRINIVIL;ZESTRIL) 20 MG tablet, Take 1 tablet by mouth daily For blood pressure, Disp: 90 tablet, Rfl: 1    Digestive Enzymes (DIGESTIVE ENZYME PO), Take by mouth daily, Disp: , Rfl:     meloxicam (MOBIC) 15 MG tablet, Take 1 tablet by mouth daily, Disp: 90 tablet, Rfl: 1    Coenzyme Q10 (CO Q 10) 100 MG CAPS, Take by mouth, Disp: , Rfl:     Multiple Vitamin (MULTI VITAMIN MENS PO), Take  by mouth., Disp: , Rfl:     Omega-3 Fatty Acids (FISH OIL) 1200 MG CAPS, Take  by mouth daily. , Disp: , Rfl:     vitamin B-12 (CYANOCOBALAMIN) 1000 MCG tablet, Take 1,000 mcg by mouth daily (Patient not taking: Reported on 2022), Disp: , Rfl:     No Known Allergies    Subjective:      Review of Systems   Constitutional: Negative for chills and fever. Respiratory: Negative for cough and shortness of breath. Cardiovascular: Negative for chest pain and palpitations. Gastrointestinal: Negative for nausea and vomiting. Neurological: Negative for dizziness, light-headedness and headaches. Objective:     BP (!) 151/90 (Site: Left Upper Arm, Position: Sitting, Cuff Size: Large Adult)   Pulse 62   Temp 97.7 °F (36.5 °C) (Temporal)   Resp 17   Ht 6' (1.829 m)   Wt 250 lb (113.4 kg)   SpO2 94%   BMI 33.91 kg/m²     Physical Exam  Vitals and nursing note reviewed. Constitutional:       Appearance: He is well-developed. HENT:      Head: Normocephalic and atraumatic. Eyes:      Conjunctiva/sclera: Conjunctivae normal.   Cardiovascular:      Rate and Rhythm: Normal rate and regular rhythm. Heart sounds: Normal heart sounds. Pulmonary:      Effort: Pulmonary effort is normal. No respiratory distress. Breath sounds: Normal breath sounds. Abdominal:      General: Bowel sounds are normal.      Palpations: Abdomen is soft. Tenderness: There is no abdominal tenderness. Musculoskeletal:      Cervical back: Normal range of motion and neck supple. Skin:     General: Skin is warm and dry. Neurological:      Mental Status: He is alert and oriented to person, place, and time. Motor: No abnormal muscle tone. Assessment/Plan:     Radha Velazquez was seen today for follow-up from hospital.    Diagnoses and all orders for this visit:    Essential hypertension  -     Blood pressure is elevated but he's been out of his Lisinopril so will restart it. He would like to get off it eventually so will continue to work on weight loss with a healthy diet and increased physical activity. -     lisinopril (PRINIVIL;ZESTRIL) 20 MG tablet; Take 1 tablet by mouth daily For blood pressure      Return in about 3 months (around 4/27/2022) for hypertension.     Electronically signed by Nurys Arizmendi MD on 1/27/2022 at 4:35 PM

## 2022-02-18 ENCOUNTER — TELEPHONE (OUTPATIENT)
Dept: FAMILY MEDICINE CLINIC | Age: 57
End: 2022-02-18

## 2022-02-18 DIAGNOSIS — I10 ESSENTIAL HYPERTENSION: Primary | ICD-10-CM

## 2022-02-18 DIAGNOSIS — R05.9 COUGH: ICD-10-CM

## 2022-02-18 RX ORDER — HYDROCHLOROTHIAZIDE 12.5 MG/1
12.5 TABLET ORAL DAILY
Qty: 90 TABLET | Refills: 0 | Status: SHIPPED | OUTPATIENT
Start: 2022-02-18 | End: 2022-03-07 | Stop reason: SDUPTHER

## 2022-02-18 RX ORDER — AZITHROMYCIN 250 MG/1
TABLET, FILM COATED ORAL
Qty: 6 TABLET | Refills: 0 | Status: SHIPPED | OUTPATIENT
Start: 2022-02-18 | End: 2022-03-17

## 2022-02-18 NOTE — TELEPHONE ENCOUNTER
Pt has dry cough from lisinopril and now it has developed into more coughing and sinus drainage. Started yesterday. He is currently in [place] at his daughters house and thinks he might be allergic to them too. He will be there until Sunday.      [facility] phone

## 2022-02-18 NOTE — TELEPHONE ENCOUNTER
----- Message from Sinan Fofana sent at 2/18/2022  8:28 AM EST -----  Subject: Message to Provider    QUESTIONS  Information for Provider? pt would like dr to give him a call back when   possible, having a dry cough due to bp medicine  ---------------------------------------------------------------------------  --------------  CALL BACK INFO  What is the best way for the office to contact you? OK to leave message on   voicemail  Preferred Call Back Phone Number? [phone]  ---------------------------------------------------------------------------  --------------  SCRIPT ANSWERS  Relationship to Patient?  Self

## 2022-03-07 ENCOUNTER — TELEPHONE (OUTPATIENT)
Dept: FAMILY MEDICINE CLINIC | Age: 57
End: 2022-03-07

## 2022-03-07 DIAGNOSIS — I10 ESSENTIAL HYPERTENSION: ICD-10-CM

## 2022-03-07 RX ORDER — HYDROCHLOROTHIAZIDE 25 MG/1
25 TABLET ORAL DAILY
Qty: 30 TABLET | Refills: 1 | Status: SHIPPED | OUTPATIENT
Start: 2022-03-07 | End: 2022-03-17 | Stop reason: SDUPTHER

## 2022-03-07 NOTE — TELEPHONE ENCOUNTER
----- Message from Ruperto Osler sent at 3/4/2022  4:11 PM EST -----  Subject: Medication Problem    QUESTIONS  Name of Medication? hydroCHLOROthiazide (HYDRODIURIL) 12.5 MG tablet  Patient-reported dosage and instructions? 1 daily   What question or problem do you have with the medication? Pt is wondering   if he could take 2 daily instead of 1 because his BP has been high. Preferred Pharmacy? [facility] - [address] [facility], [address] [place] [address] [place] Lesley Zaidi [phone] - F [phone]  Pharmacy phone number (if available)? [phone]  Additional Information for Provider?   ---------------------------------------------------------------------------  --------------  [address]  What is the best way for the office to contact you? OK to leave message on   voicemail  Preferred Call Back Phone Number? [phone]  ---------------------------------------------------------------------------  --------------  SCRIPT ANSWERS  Relationship to Patient?  Self

## 2022-03-15 ASSESSMENT — ENCOUNTER SYMPTOMS
SHORTNESS OF BREATH: 0
COUGH: 0
VOMITING: 0
NAUSEA: 0

## 2022-03-15 NOTE — PROGRESS NOTES
[address]  [address] [place] [address]  Phone:  [phone]          Name: Calli Martinez  : 1965    Chief Complaint   Patient presents with    Blood Pressure Check       HPI:     Calli Martinez is a 61 y.o. male who presents today for follow up of hypertension. He was on Lisinopril and his BP was controlled, but he developed a cough but it was discontinued and he was started on HCTZ. Since that time, his BP has been elevated. Lab Results   Component Value Date     2021    K 4.5 2021     2021    CO2 30 2021    BUN 16 2021    CREATININE 1.0 2021    GLUCOSE 98 2021    CALCIUM 9.2 10/09/2021        Current Outpatient Medications:     vitamin B-12 (CYANOCOBALAMIN) 1000 MCG tablet, Take 1,000 mcg by mouth daily, Disp: , Rfl:     losartan (COZAAR) 25 MG tablet, Take 1 tablet by mouth daily, Disp: 30 tablet, Rfl: 0    hydroCHLOROthiazide (HYDRODIURIL) 25 MG tablet, Take 1 tablet by mouth daily, Disp: 90 tablet, Rfl: 1    Cholecalciferol (D3 PO), Take by mouth, Disp: , Rfl:     ZINC PO, Take by mouth daily, Disp: , Rfl:     Digestive Enzymes (DIGESTIVE ENZYME PO), Take by mouth daily, Disp: , Rfl:     meloxicam (MOBIC) 15 MG tablet, Take 1 tablet by mouth daily, Disp: 90 tablet, Rfl: 1    Coenzyme Q10 (CO Q 10) 100 MG CAPS, Take by mouth, Disp: , Rfl:     Multiple Vitamin (MULTI VITAMIN MENS PO), Take  by mouth., Disp: , Rfl:     Omega-3 Fatty Acids (FISH OIL) 1200 MG CAPS, Take  by mouth daily. , Disp: , Rfl:     No Known Allergies    Subjective:      Review of Systems   Constitutional: Negative for chills and fever. Respiratory: Negative for cough and shortness of breath. Cardiovascular: Negative for chest pain and palpitations. Gastrointestinal: Negative for nausea and vomiting. Neurological: Negative for dizziness, light-headedness and headaches.        Objective:     BP (!) 152/92 (Site: Left Upper Arm, Position: Sitting, Cuff Size: Medium Adult)   Pulse 67   Temp 97.7 °F (36.5 °C) (Temporal)   Resp 16   Ht 6' 0.01\" (1.829 m)   Wt 251 lb (113.9 kg)   SpO2 93%   BMI 34.03 kg/m²     Physical Exam  Vitals and nursing note reviewed. Constitutional:       Appearance: He is well-developed. HENT:      Head: Normocephalic and atraumatic. Eyes:      Conjunctiva/sclera: Conjunctivae normal.   Cardiovascular:      Rate and Rhythm: Normal rate and regular rhythm. Heart sounds: Normal heart sounds. Pulmonary:      Effort: Pulmonary effort is normal. No respiratory distress. Breath sounds: Normal breath sounds. Abdominal:      General: Bowel sounds are normal.      Palpations: Abdomen is soft. Tenderness: There is no abdominal tenderness. Musculoskeletal:      Cervical back: Normal range of motion and neck supple. Skin:     General: Skin is warm and dry. Neurological:      Mental Status: He is alert and oriented to person, place, and time. Motor: No abnormal muscle tone. Assessment/Plan:     Bonita Grant was seen today for blood pressure check. Diagnoses and all orders for this visit:    Essential hypertension  -      Blood pressure has been elevated so will add Losartan 25 mg daily to his HCTZ. He will call report of his BPs in 1 week. -      losartan (COZAAR) 25 MG tablet; Take 1 tablet by mouth daily      Return in about 6 months (around 9/17/2022) for hypertension; call report of BP in 1 week.     Electronically signed by Davina Gil MD on 3/17/2022 at 4:01 PM

## 2022-03-17 ENCOUNTER — OFFICE VISIT (OUTPATIENT)
Dept: FAMILY MEDICINE CLINIC | Age: 57
End: 2022-03-17
Payer: COMMERCIAL

## 2022-03-17 VITALS
BODY MASS INDEX: 34 KG/M2 | RESPIRATION RATE: 16 BRPM | HEART RATE: 67 BPM | DIASTOLIC BLOOD PRESSURE: 92 MMHG | TEMPERATURE: 97.7 F | HEIGHT: 72 IN | OXYGEN SATURATION: 93 % | SYSTOLIC BLOOD PRESSURE: 152 MMHG | WEIGHT: 251 LBS

## 2022-03-17 DIAGNOSIS — I10 ESSENTIAL HYPERTENSION: Primary | ICD-10-CM

## 2022-03-17 PROCEDURE — 99213 OFFICE O/P EST LOW 20 MIN: CPT | Performed by: FAMILY MEDICINE

## 2022-03-17 RX ORDER — LOSARTAN POTASSIUM 25 MG/1
25 TABLET ORAL DAILY
Qty: 30 TABLET | Refills: 0 | Status: SHIPPED | OUTPATIENT
Start: 2022-03-17 | End: 2022-04-12 | Stop reason: SDUPTHER

## 2022-03-17 RX ORDER — LANOLIN ALCOHOL/MO/W.PET/CERES
1000 CREAM (GRAM) TOPICAL DAILY
COMMUNITY

## 2022-03-17 RX ORDER — HYDROCHLOROTHIAZIDE 25 MG/1
25 TABLET ORAL DAILY
Qty: 90 TABLET | Refills: 1 | Status: SHIPPED | OUTPATIENT
Start: 2022-03-17 | End: 2022-05-17 | Stop reason: SDUPTHER

## 2022-03-29 RX ORDER — MELOXICAM 15 MG/1
15 TABLET ORAL DAILY
Qty: 90 TABLET | Refills: 0 | Status: SHIPPED | OUTPATIENT
Start: 2022-03-29 | End: 2022-11-02 | Stop reason: SDUPTHER

## 2022-04-11 ASSESSMENT — ENCOUNTER SYMPTOMS
SHORTNESS OF BREATH: 0
VOMITING: 0
NAUSEA: 0
COUGH: 0

## 2022-04-11 NOTE — PROGRESS NOTES
[address]  [address] [place] [address]  Phone:  [phone]          Name: Sheila Rojo  : 1965    Chief Complaint   Patient presents with    Other     HTN     Medication Check       HPI:     Sheila Rojo is a 61 y.o. male who presents today for follow up of hypertension. He was on Lisinopril and his BP was controlled, but he developed a cough but it was discontinued and he was started on HCTZ. At last check his BP was still elevated so he was started on Losartan 25 mg daily. He's been checking his BP at home and it was 152/87 in his left arm and 142/83 in his right arm, then another time 162/82 in the left and 147/77 in the right arm. Today's BPs were 156/88 in the left arm and 138/80 in the right arm. Lab Results   Component Value Date     2021    K 4.5 2021     2021    CO2 30 2021    BUN 16 2021    CREATININE 1.0 2021    GLUCOSE 98 2021    CALCIUM 9.2 10/09/2021        Current Outpatient Medications:     losartan (COZAAR) 50 MG tablet, Take 1 tablet by mouth daily, Disp: 90 tablet, Rfl: 0    meloxicam (MOBIC) 15 MG tablet, Take 1 tablet by mouth daily, Disp: 90 tablet, Rfl: 0    vitamin B-12 (CYANOCOBALAMIN) 1000 MCG tablet, Take 1,000 mcg by mouth daily, Disp: , Rfl:     hydroCHLOROthiazide (HYDRODIURIL) 25 MG tablet, Take 1 tablet by mouth daily, Disp: 90 tablet, Rfl: 1    Cholecalciferol (D3 PO), Take by mouth, Disp: , Rfl:     ZINC PO, Take by mouth daily, Disp: , Rfl:     Digestive Enzymes (DIGESTIVE ENZYME PO), Take by mouth daily, Disp: , Rfl:     Coenzyme Q10 (CO Q 10) 100 MG CAPS, Take by mouth, Disp: , Rfl:     Multiple Vitamin (MULTI VITAMIN MENS PO), Take  by mouth., Disp: , Rfl:     Omega-3 Fatty Acids (FISH OIL) 1200 MG CAPS, Take  by mouth daily. , Disp: , Rfl:     No Known Allergies    Subjective:      Review of Systems   Constitutional: Negative for chills and fever. Respiratory: Negative for cough and shortness of breath. Cardiovascular: Negative for chest pain and palpitations. Gastrointestinal: Negative for nausea and vomiting. Neurological: Negative for dizziness, light-headedness and headaches. Objective:     BP (!) 165/85 (Site: Left Upper Arm, Position: Sitting, Cuff Size: Medium Adult)   Pulse 69   Temp 97.1 °F (36.2 °C) (Temporal)   Resp 17   Ht 6' (1.829 m)   Wt 254 lb (115.2 kg)   SpO2 97%   BMI 34.45 kg/m²     Physical Exam  Vitals and nursing note reviewed. Constitutional:       Appearance: He is well-developed. HENT:      Head: Normocephalic and atraumatic. Eyes:      Conjunctiva/sclera: Conjunctivae normal.   Cardiovascular:      Rate and Rhythm: Normal rate and regular rhythm. Heart sounds: Normal heart sounds. Pulmonary:      Effort: Pulmonary effort is normal. No respiratory distress. Breath sounds: Normal breath sounds. Abdominal:      General: Bowel sounds are normal.      Palpations: Abdomen is soft. Tenderness: There is no abdominal tenderness. Musculoskeletal:      Cervical back: Normal range of motion and neck supple. Skin:     General: Skin is warm and dry. Neurological:      Mental Status: He is alert and oriented to person, place, and time. Motor: No abnormal muscle tone. Assessment/Plan:     Cole Valle was seen today for other and medication check. Diagnoses and all orders for this visit:    Essential hypertension  -     BP is elevated so will increase Losartan from 25 mg to 50 mg daily. -     losartan (COZAAR) 50 MG tablet; Take 1 tablet by mouth daily    Asymmetric blood pressures  -    Will check ABIs to evaluate for possible vascular cause of his asymmetric blood pressures. -    VL HARDIK BILATERAL LIMITED 1-2 LEVELS; Future      Return in about 2 weeks (around 4/26/2022) for hypertension.     Electronically signed by Chente Engle MD on 4/12/2022 at 3:59 PM

## 2022-04-12 ENCOUNTER — OFFICE VISIT (OUTPATIENT)
Dept: FAMILY MEDICINE CLINIC | Age: 57
End: 2022-04-12
Payer: COMMERCIAL

## 2022-04-12 VITALS
SYSTOLIC BLOOD PRESSURE: 165 MMHG | HEART RATE: 69 BPM | DIASTOLIC BLOOD PRESSURE: 85 MMHG | RESPIRATION RATE: 17 BRPM | BODY MASS INDEX: 34.4 KG/M2 | TEMPERATURE: 97.1 F | WEIGHT: 254 LBS | HEIGHT: 72 IN | OXYGEN SATURATION: 97 %

## 2022-04-12 DIAGNOSIS — I10 ESSENTIAL HYPERTENSION: Primary | ICD-10-CM

## 2022-04-12 DIAGNOSIS — I99.8 ASYMMETRIC BLOOD PRESSURES: ICD-10-CM

## 2022-04-12 DIAGNOSIS — I10 ESSENTIAL HYPERTENSION: ICD-10-CM

## 2022-04-12 PROCEDURE — 99214 OFFICE O/P EST MOD 30 MIN: CPT | Performed by: FAMILY MEDICINE

## 2022-04-12 RX ORDER — LOSARTAN POTASSIUM 50 MG/1
50 TABLET ORAL DAILY
Qty: 90 TABLET | Refills: 0 | Status: SHIPPED | OUTPATIENT
Start: 2022-04-12 | End: 2022-07-05

## 2022-04-12 RX ORDER — LOSARTAN POTASSIUM 25 MG/1
25 TABLET ORAL DAILY
Qty: 30 TABLET | Refills: 0 | OUTPATIENT
Start: 2022-04-12

## 2022-04-12 NOTE — PATIENT INSTRUCTIONS
Patient Education        Low Back Pain: Exercises  Introduction  Here are some examples of exercises for you to try. The exercises may be suggested for a condition or for rehabilitation. Start each exercise slowly. Ease off the exercises if you start to have pain. You will be told when to start these exercises and which ones will work bestfor you. How to do the exercises  Press-up    1. Lie on your stomach, supporting your body with your forearms. 2. Press your elbows down into the floor to raise your upper back. As you do this, relax your stomach muscles and allow your back to arch without using your back muscles. As your press up, do not let your hips or pelvis come off the floor. 3. Hold for 15 to 30 seconds, then relax. 4. Repeat 2 to 4 times. Alternate arm and leg (bird dog) exercise    Do this exercise slowly. Try to keep your body straight at all times, and donot let one hip drop lower than the other. 1. Start on the floor, on your hands and knees. 2. Tighten your belly muscles. 3. Raise one leg off the floor, and hold it straight out behind you. Be careful not to let your hip drop down, because that will twist your trunk. 4. Hold for about 6 seconds, then lower your leg and switch to the other leg. 5. Repeat 8 to 12 times on each leg. 6. Over time, work up to holding for 10 to 30 seconds each time. 7. If you feel stable and secure with your leg raised, try raising the opposite arm straight out in front of you at the same time. Knee-to-chest exercise    1. Lie on your back with your knees bent and your feet flat on the floor. 2. Bring one knee to your chest, keeping the other foot flat on the floor (or keeping the other leg straight, whichever feels better on your lower back). 3. Keep your lower back pressed to the floor. Hold for at least 15 to 30 seconds. 4. Relax, and lower the knee to the starting position. 5. Repeat with the other leg. Repeat 2 to 4 times with each leg.   6. To get more stretch, put your other leg flat on the floor while pulling your knee to your chest.  Curl-ups    1. Lie on the floor on your back with your knees bent at a 90-degree angle. Your feet should be flat on the floor, about 12 inches from your buttocks. 2. Cross your arms over your chest. If this bothers your neck, try putting your hands behind your neck (not your head), with your elbows spread apart. 3. Slowly tighten your belly muscles and raise your shoulder blades off the floor. 4. Keep your head in line with your body, and do not press your chin to your chest.  5. Hold this position for 1 or 2 seconds, then slowly lower yourself back down to the floor. 6. Repeat 8 to 12 times. Pelvic tilt exercise    1. Lie on your back with your knees bent. 2. \"Brace\" your stomach. This means to tighten your muscles by pulling in and imagining your belly button moving toward your spine. You should feel like your back is pressing to the floor and your hips and pelvis are rocking back. 3. Hold for about 6 seconds while you breathe smoothly. 4. Repeat 8 to 12 times. Heel dig bridging    1. Lie on your back with both knees bent and your ankles bent so that only your heels are digging into the floor. Your knees should be bent about 90 degrees. 2. Then push your heels into the floor, squeeze your buttocks, and lift your hips off the floor until your shoulders, hips, and knees are all in a straight line. 3. Hold for about 6 seconds as you continue to breathe normally, and then slowly lower your hips back down to the floor and rest for up to 10 seconds. 4. Do 8 to 12 repetitions. Hamstring stretch in doorway    1. Lie on your back in a doorway, with one leg through the open door. 2. Slide your leg up the wall to straighten your knee. You should feel a gentle stretch down the back of your leg. 3. Hold the stretch for at least 15 to 30 seconds. Do not arch your back, point your toes, or bend either knee.  Keep one heel touching the floor and the other heel touching the wall. 4. Repeat with your other leg. 5. Do 2 to 4 times for each leg. Hip flexor stretch    1. Kneel on the floor with one knee bent and one leg behind you. Place your forward knee over your foot. Keep your other knee touching the floor. 2. Slowly push your hips forward until you feel a stretch in the upper thigh of your rear leg. 3. Hold the stretch for at least 15 to 30 seconds. Repeat with your other leg. 4. Do 2 to 4 times on each side. Wall sit    1. Stand with your back 10 to 12 inches away from a wall. 2. Lean into the wall until your back is flat against it. 3. Slowly slide down until your knees are slightly bent, pressing your lower back into the wall. 4. Hold for about 6 seconds, then slide back up the wall. 5. Repeat 8 to 12 times. Follow-up care is a key part of your treatment and safety. Be sure to make and go to all appointments, and call your doctor if you are having problems. It's also a good idea to know your test results and keep alist of the medicines you take. Where can you learn more? Go to https://[employer]. org and sign in to your [email or web address] account. Enter F657 in the [employer] box to learn more about \"Low Back Pain: Exercises. \"     If you do not have an account, please click on the \"Sign Up Now\" link. Current as of: July 1, 2021               Content Version: 13.2  © 2006-2022 Healthwise, Incorporated. Care instructions adapted under license by [facility] ([facility]). If you have questions about a medical condition or this instruction, always ask your healthcare professional. [place] [address] any warranty or liability for your use of this information. Patient Education        Hip Arthritis: Exercises  Introduction  Here are some examples of exercises for you to try. The exercises may be suggested for a condition or for rehabilitation. Start each exercise slowly. Ease off the exercises if you 6 seconds as you continue to breathe normally, and then slowly lower your hips back down to the floor and rest for up to 10 seconds. 4. Repeat 8 to 12 times. Hamstring stretch (lying down)    1. Lie flat on your back with your legs straight. If you feel discomfort in your back, place a small towel roll under your lower back. 2. Holding the back of your affected leg, lift your leg straight up and toward your body until you feel a stretch at the back of your thigh. 3. Hold the stretch for at least 30 seconds. 4. Repeat 2 to 4 times. 5. Switch legs and repeat steps 1 through 4, even if only one hip is sore. Standing quadriceps stretch    1. If you are not steady on your feet, hold on to a chair, counter, or wall. You can also lie on your stomach or your side to do this exercise. 2. Bend the knee of the leg you want to stretch, and reach behind you to grab the front of your foot or ankle with the hand on the same side. For example, if you are stretching your right leg, use your right hand. 3. Keeping your knees next to each other, pull your foot toward your buttock until you feel a gentle stretch across the front of your hip and down the front of your thigh. Your knee should be pointed directly to the ground, and not out to the side. 4. Hold the stretch for at least 15 to 30 seconds. 5. Repeat 2 to 4 times. 6. Switch legs and repeat steps 1 through 5, even if only one hip is sore. Hip rotator stretch    1. Lie on your back with both knees bent and your feet flat on the floor. 2. Put the ankle of your affected leg on your opposite thigh near your knee. 3. Use your hand to gently push your knee away from your body until you feel a gentle stretch around your hip. 4. Hold the stretch for 15 to 30 seconds. 5. Repeat 2 to 4 times. 6. Repeat steps 1 through 5, but this time use your hand to gently pull your knee toward your opposite shoulder.   7. Switch legs and repeat steps 1 through 6, even if only one hip is sore. Knee-to-chest    1. Lie on your back with your knees bent and your feet flat on the floor. 2. Bring your affected leg to your chest, keeping the other foot flat on the floor (or keeping the other leg straight, whichever feels better on your lower back). 3. Keep your lower back pressed to the floor. Hold for at least 15 to 30 seconds. 4. Relax, and lower the knee to the starting position. 5. Repeat 2 to 4 times. 6. Switch legs and repeat steps 1 through 5, even if only one hip is sore. 7. To get more stretch, put your other leg flat on the floor while pulling your knee to your chest.  Clamshell    1. Lie on your side, with your affected hip on top. Keep your feet and knees together and your knees bent. 2. Raise your top knee, but keep your feet together. Do not let your hips roll back. Your legs should open up like a clamshell. 3. Hold for 6 seconds. 4. Slowly lower your knee back down. Rest for 10 seconds. 5. Repeat 8 to 12 times. 6. Switch legs and repeat steps 1 through 5, even if only one hip is sore. Follow-up care is a key part of your treatment and safety. Be sure to make and go to all appointments, and call your doctor if you are having problems. It's also a good idea to know your test results and keep alist of the medicines you take. Where can you learn more? Go to https://[email or web address]. org and sign in to your [employer] account. Enter H143 in the [facility] box to learn more about \"Hip Arthritis: Exercises. \"     If you do not have an account, please click on the \"Sign Up Now\" link. Current as of: July 1, 2021               Content Version: 13.2  © 2006-2022 Healthwise, Incorporated. Care instructions adapted under license by [facility] ([facility]). If you have questions about a medical condition or this instruction, always ask your healthcare professional. [place] [address] any warranty or liability for your use of this information.

## 2022-04-19 NOTE — PROGRESS NOTES
[address]  [address] [place] [address]  Phone:  [phone]          Name: Jammie Simon  : 1965    Chief Complaint   Patient presents with    Joint Swelling     left       HPI:     Jammie Simon is a 61 y.o. male who presents today for evaluation of a swollen left elbow. There was no inciting injury. He's noticed it since he began taking his BP more frequently. The elbow and just below the elbow are swollen and sore to touch. No increased redness. It's mildly warm. No fevers. Current Outpatient Medications:     cephALEXin (KEFLEX) 500 MG capsule, Take 1 capsule by mouth 2 times daily for 7 days, Disp: 14 capsule, Rfl: 0    predniSONE (DELTASONE) 10 MG tablet, Take 1 tablet by mouth daily Take 5 tabs daily for 3 days, then 4 tabs daily for 3 days, then 3 tabs daily for 3 days, then 2 tabs daily for 3 days, then 1 tab daily for 3 days. , Disp: 45 tablet, Rfl: 0    losartan (COZAAR) 50 MG tablet, Take 1 tablet by mouth daily, Disp: 90 tablet, Rfl: 0    meloxicam (MOBIC) 15 MG tablet, Take 1 tablet by mouth daily, Disp: 90 tablet, Rfl: 0    vitamin B-12 (CYANOCOBALAMIN) 1000 MCG tablet, Take 1,000 mcg by mouth daily, Disp: , Rfl:     hydroCHLOROthiazide (HYDRODIURIL) 25 MG tablet, Take 1 tablet by mouth daily, Disp: 90 tablet, Rfl: 1    Cholecalciferol (D3 PO), Take by mouth, Disp: , Rfl:     ZINC PO, Take by mouth daily, Disp: , Rfl:     Digestive Enzymes (DIGESTIVE ENZYME PO), Take by mouth daily, Disp: , Rfl:     Coenzyme Q10 (CO Q 10) 100 MG CAPS, Take by mouth, Disp: , Rfl:     Multiple Vitamin (MULTI VITAMIN MENS PO), Take  by mouth., Disp: , Rfl:     Omega-3 Fatty Acids (FISH OIL) 1200 MG CAPS, Take  by mouth daily. , Disp: , Rfl:     No Known Allergies    Subjective:      Review of Systems   Musculoskeletal: Positive for arthralgias and joint swelling. Neurological: Negative for weakness and numbness.        Objective:     /84 (Site: Left Upper Arm, Position: Sitting)   Pulse 63   Temp 98.7 °F (37.1 °C) (Temporal)   Resp 16   Wt 250 lb (113.4 kg)   SpO2 97%   BMI 33.91 kg/m²     Physical Exam  Constitutional:       Appearance: He is well-developed. HENT:      Head: Normocephalic and atraumatic. Eyes:      Conjunctiva/sclera: Conjunctivae normal.   Cardiovascular:      Rate and Rhythm: Normal rate and regular rhythm. Heart sounds: Normal heart sounds. Pulmonary:      Effort: Pulmonary effort is normal. No respiratory distress. Breath sounds: Normal breath sounds. Abdominal:      General: Bowel sounds are normal.      Palpations: Abdomen is soft. Tenderness: There is no abdominal tenderness. Musculoskeletal:      Right elbow: Normal.      Left elbow: Swelling present. Normal range of motion. Tenderness present in olecranon process. Cervical back: Normal range of motion and neck supple. Skin:     General: Skin is warm and dry. Neurological:      Mental Status: He is alert and oriented to person, place, and time. Motor: No abnormal muscle tone. Assessment/Plan:     Meng Tang was seen today for joint swelling. Diagnoses and all orders for this visit:    Olecranon bursitis of left elbow  -     A handout of exercises was provided. -     cephALEXin (KEFLEX) 500 MG capsule; Take 1 capsule by mouth 2 times daily for 7 days  -     predniSONE (DELTASONE) 10 MG tablet; Take 1 tablet by mouth daily Take 5 tabs daily for 3 days, then 4 tabs daily for 3 days, then 3 tabs daily for 3 days, then 2 tabs daily for 3 days, then 1 tab daily for 3 days. Return if symptoms worsen or fail to improve.     Electronically signed by Nick Claudio MD on 4/21/2022 at 4:11 PM

## 2022-04-21 ENCOUNTER — OFFICE VISIT (OUTPATIENT)
Dept: FAMILY MEDICINE CLINIC | Age: 57
End: 2022-04-21
Payer: COMMERCIAL

## 2022-04-21 VITALS
RESPIRATION RATE: 16 BRPM | DIASTOLIC BLOOD PRESSURE: 84 MMHG | OXYGEN SATURATION: 97 % | SYSTOLIC BLOOD PRESSURE: 118 MMHG | BODY MASS INDEX: 33.91 KG/M2 | HEART RATE: 63 BPM | TEMPERATURE: 98.7 F | WEIGHT: 250 LBS

## 2022-04-21 DIAGNOSIS — M70.22 OLECRANON BURSITIS OF LEFT ELBOW: Primary | ICD-10-CM

## 2022-04-21 PROCEDURE — 99213 OFFICE O/P EST LOW 20 MIN: CPT | Performed by: FAMILY MEDICINE

## 2022-04-21 RX ORDER — PREDNISONE 10 MG/1
10 TABLET ORAL DAILY
Qty: 45 TABLET | Refills: 0 | Status: SHIPPED | OUTPATIENT
Start: 2022-04-21 | End: 2022-05-17 | Stop reason: SDUPTHER

## 2022-04-21 RX ORDER — CEPHALEXIN 500 MG/1
500 CAPSULE ORAL 2 TIMES DAILY
Qty: 14 CAPSULE | Refills: 0 | Status: SHIPPED | OUTPATIENT
Start: 2022-04-21 | End: 2022-04-28

## 2022-04-21 NOTE — PATIENT INSTRUCTIONS
Patient Education        Bursitis of the Elbow: Care Instructions  Your Care Instructions  Bursitis is pain and swelling of the bursae. These are sacs of fluid that help your joints move smoothly. Olecranon bursitis is a type of bursitis that affects the back of the elbow. This is sometimes called Torsten elbow becausethe bump that develops looks like the cartoon character Torsten's elbow. Injury, overuse, or prolonged pressure on your elbow can cause this form of bursitis. Sometimes it happens when people have arthritis. It also can occurfor unknown reasons. Treatment may include draining fluid from the bursa with a needle. If your doctor thought there was infection, he or she may have prescribed antibiotics. You also may get shots of medicine into the bursa to help the swelling go down. Your elbow should get better in a few days or weeks. Follow-up care is a key part of your treatment and safety. Be sure to make and go to all appointments, and call your doctor if you are having problems. It's also a good idea to know your test results and keep alist of the medicines you take. How can you care for yourself at home?  Take pain medicines exactly as directed. ? If the doctor gave you a prescription medicine for pain, take it as prescribed. ? If you are not taking a prescription pain medicine, ask your doctor if you can take an over-the-counter medicine. ? Do not take two or more pain medicines at the same time unless the doctor told you to. Many pain medicines have acetaminophen, which is Tylenol. Too much acetaminophen (Tylenol) can be harmful.  If your doctor prescribed antibiotics, take them as directed. Do not stop taking them just because you feel better. You need to take the full course of antibiotics.  If your doctor gave you a sling, an elastic bandage, or a compression sleeve, wear it exactly as instructed.  Put ice or a cold pack on your elbow for 10 to 20 minutes at a time.  Try to do this every 1 to 2 hours for the next 3 days (when you are awake) or until the swelling goes down. Put a thin cloth between the ice and your skin.  After 3 days, you can try heat, or alternate heat and ice.  Rest your elbow. Try to stop or reduce any activity that causes pain.  Wear elbow pads during physical activity to prevent injury.  Do not lean your elbows on tables or armrests. When should you call for help? Call your doctor now or seek immediate medical care if:     You have new or worse symptoms of infection, such as:  ? Increased pain, swelling, warmth, or redness. ? Red streaks leading from the area. ? Pus draining from the area. ? A fever. Watch closely for changes in your health, and be sure to contact your doctor if:     You do not get better as expected. Where can you learn more? Go to https://"[email or web address]pepiceweb.Loudie. org and sign in to your [employer] account. Enter  in the [facility] box to learn more about \"Bursitis of the Elbow: Care Instructions. \"     If you do not have an account, please click on the \"Sign Up Now\" link. Current as of: July 1, 2021               Content Version: 13.2  © 2006-2022 [employer] stars. Care instructions adapted under license by [facility] ([facility]). If you have questions about a medical condition or this instruction, always ask your healthcare professional. [place] [address] any warranty or liability for your use of this information. Patient Education        Elbow Bursitis: Exercises  Introduction  Here are some examples of exercises for you to try. The exercises may be suggested for a condition or for rehabilitation. Start each exercise slowly. Ease off the exercises if you start to have pain. You will be told when to start these exercises and which ones will work bestfor you. How to do the exercises  Elbow flexion stretch    1. Lift the arm that bothers you, and bend the elbow.  Your palm should face toward you.  2. With your other hand, gently push on the back of your affected forearm. Press your hand toward your shoulder until you feel a stretch in the back of your upper arm. 3. Hold for at least 15 to 30 seconds. 4. Repeat 2 to 4 times. Elbow extension stretch    1. Extend your affected arm in front of you with your palm facing away from you. 2. Bend back your wrist, pointing your hand up toward the ceiling. 3. With your other hand, gently bend your wrist farther until you feel a mild to moderate stretch in your forearm. 4. Hold for at least 15 to 30 seconds. 5. Repeat 2 to 4 times. 6. Repeat steps 1 through 5. But this time extend your affected arm in front of you with your palm facing up. Then bend back your wrist, pointing your hand toward the floor. Pronation and supination stretch    1. Keep your affected elbow at your side, bent at about 90 degrees. Grasp a pen, pencil, or stick, and wrap your hand around it. If you don't have something to hold on to, make a fist instead. 2. Slowly turn your forearm as far as you can back and forth in each direction. Your hand should face up and then down. 3. Hold each position for about 6 seconds. 4. Relax for up to 10 seconds between repetitions. 5. Repeat 8 to 12 times. Hand flips    1. While seated, place your affected forearm on your thigh. Your palm should face down. 2. Flip your hand over so the back of your hand rests on your thigh and your palm is up. Alternate between palm up and palm down while keeping your forearm on your thigh. 3. Repeat 8 to 12 times. Follow-up care is a key part of your treatment and safety. Be sure to make and go to all appointments, and call your doctor if you are having problems. It's also a good idea to know your test results and keep alist of the medicines you take. Where can you learn more? Go to https://kyler.Dexmo. org and sign in to your [employer] account.  Enter N358 in the [place] box to learn more about \"Elbow Bursitis: Exercises. \"     If you do not have an account, please click on the \"Sign Up Now\" link. Current as of: July 1, 2021               Content Version: 13.2  © 8727-9972 Healthwise[employer] Incorporated. Care instructions adapted under license by [facility] ([facility]). If you have questions about a medical condition or this instruction, always ask your healthcare professional. [address] any warranty or liability for your use of this information.

## 2022-04-25 ENCOUNTER — TELEPHONE (OUTPATIENT)
Dept: FAMILY MEDICINE CLINIC | Age: 57
End: 2022-04-25

## 2022-04-25 ENCOUNTER — HOSPITAL ENCOUNTER (OUTPATIENT)
Dept: INTERVENTIONAL RADIOLOGY/VASCULAR | Age: 57
Discharge: HOME | End: 2022-04-25
Payer: COMMERCIAL

## 2022-04-25 DIAGNOSIS — I99.8 ASYMMETRIC BLOOD PRESSURES: ICD-10-CM

## 2022-04-25 DIAGNOSIS — I99.8 ASYMMETRIC BLOOD PRESSURES: Primary | ICD-10-CM

## 2022-04-25 PROCEDURE — 93930 UPPER EXTREMITY STUDY: CPT

## 2022-05-10 NOTE — PROGRESS NOTES
[address]  [address] [place] [address]  Phone:  [phone]          Name: Pablito Charles  : 1965    Chief Complaint   Patient presents with    Follow-up       HPI:     Pablito Charles is a 61 y.o. male who presents today for evaluation of a swollen left elbow and hypertension. He developed left elbow pain and swelling last month with no inciting injury. He was treated with oral steroids which helped quite a bit, but then he was working under a sink and leaning on his elbow. He then struck it and it started swelling again. His blood pressure had been doing well but he ran out of HCTZ last week and it's been elevated since. He's still taking Losartan 50 mg daily. Current Outpatient Medications:     hydroCHLOROthiazide (HYDRODIURIL) 25 MG tablet, Take 1 tablet by mouth daily, Disp: 90 tablet, Rfl: 1    predniSONE (DELTASONE) 10 MG tablet, Take 1 tablet by mouth daily Take 5 tabs daily for 3 days, then 4 tabs daily for 3 days, then 3 tabs daily for 3 days, then 2 tabs daily for 3 days, then 1 tab daily for 3 days. , Disp: 45 tablet, Rfl: 0    losartan (COZAAR) 50 MG tablet, Take 1 tablet by mouth daily, Disp: 90 tablet, Rfl: 0    meloxicam (MOBIC) 15 MG tablet, Take 1 tablet by mouth daily, Disp: 90 tablet, Rfl: 0    vitamin B-12 (CYANOCOBALAMIN) 1000 MCG tablet, Take 1,000 mcg by mouth daily, Disp: , Rfl:     Cholecalciferol (D3 PO), Take by mouth, Disp: , Rfl:     ZINC PO, Take by mouth daily, Disp: , Rfl:     Digestive Enzymes (DIGESTIVE ENZYME PO), Take by mouth daily, Disp: , Rfl:     Coenzyme Q10 (CO Q 10) 100 MG CAPS, Take by mouth, Disp: , Rfl:     Multiple Vitamin (MULTI VITAMIN MENS PO), Take  by mouth., Disp: , Rfl:     Omega-3 Fatty Acids (FISH OIL) 1200 MG CAPS, Take  by mouth daily. , Disp: , Rfl:     No Known Allergies    Subjective:      Review of Systems   Musculoskeletal: Positive for arthralgias and joint swelling.    Neurological: Negative for weakness and numbness. Objective:     BP (!) 170/86 (Site: Left Upper Arm, Position: Sitting, Cuff Size: Medium Adult)   Pulse 63   Temp 97.7 °F (36.5 °C) (Temporal)   Resp 16   Ht 6' 0.01\" (1.829 m)   Wt 259 lb (117.5 kg)   SpO2 97%   BMI 35.12 kg/m²     Physical Exam  Constitutional:       Appearance: He is well-developed. HENT:      Head: Normocephalic and atraumatic. Eyes:      Conjunctiva/sclera: Conjunctivae normal.   Cardiovascular:      Rate and Rhythm: Normal rate and regular rhythm. Heart sounds: Normal heart sounds. Pulmonary:      Effort: Pulmonary effort is normal. No respiratory distress. Breath sounds: Normal breath sounds. Abdominal:      General: Bowel sounds are normal.      Palpations: Abdomen is soft. Tenderness: There is no abdominal tenderness. Musculoskeletal:      Right elbow: Normal.      Left elbow: Swelling and effusion present. Normal range of motion. Tenderness present. Cervical back: Normal range of motion and neck supple. Skin:     General: Skin is warm and dry. Neurological:      Mental Status: He is alert and oriented to person, place, and time. Motor: No abnormal muscle tone. Assessment/Plan:     Rose Montes was seen today for follow-up. Diagnoses and all orders for this visit:    Olecranon bursitis of left elbow  -     Will treat with oral steroids, ice, and rest.  -     predniSONE (DELTASONE) 10 MG tablet; Take 1 tablet by mouth daily Take 5 tabs daily for 3 days, then 4 tabs daily for 3 days, then 3 tabs daily for 3 days, then 2 tabs daily for 3 days, then 1 tab daily for 3 days. Essential hypertension  -     BP is uncontrolled but he's been out of his HCTZ so will resume it and continue on Losartan. -     hydroCHLOROthiazide (HYDRODIURIL) 25 MG tablet; Take 1 tablet by mouth daily      Return in about 1 week (around 5/24/2022) for call with BP update.     Electronically signed by Abril Novoa MD on 5/17/2022 at 3:23 PM

## 2022-05-17 ENCOUNTER — OFFICE VISIT (OUTPATIENT)
Dept: FAMILY MEDICINE CLINIC | Age: 57
End: 2022-05-17
Payer: COMMERCIAL

## 2022-05-17 DIAGNOSIS — I10 ESSENTIAL HYPERTENSION: ICD-10-CM

## 2022-05-17 DIAGNOSIS — M70.22 OLECRANON BURSITIS OF LEFT ELBOW: Primary | ICD-10-CM

## 2022-05-17 PROCEDURE — 99214 OFFICE O/P EST MOD 30 MIN: CPT | Performed by: FAMILY MEDICINE

## 2022-05-17 RX ORDER — PREDNISONE 10 MG/1
10 TABLET ORAL DAILY
Qty: 45 TABLET | Refills: 0 | Status: SHIPPED | OUTPATIENT
Start: 2022-05-17

## 2022-05-17 RX ORDER — HYDROCHLOROTHIAZIDE 25 MG/1
25 TABLET ORAL DAILY
Qty: 90 TABLET | Refills: 1 | Status: SHIPPED | OUTPATIENT
Start: 2022-05-17

## 2022-07-02 DIAGNOSIS — I10 ESSENTIAL HYPERTENSION: ICD-10-CM

## 2022-07-05 RX ORDER — LOSARTAN POTASSIUM 50 MG/1
TABLET ORAL
Qty: 90 TABLET | Refills: 1 | Status: SHIPPED | OUTPATIENT
Start: 2022-07-05

## 2022-07-05 ASSESSMENT — PATIENT HEALTH QUESTIONNAIRE - PHQ9
SUM OF ALL RESPONSES TO PHQ QUESTIONS 1-9: 0
SUM OF ALL RESPONSES TO PHQ QUESTIONS 1-9: 0
1. LITTLE INTEREST OR PLEASURE IN DOING THINGS: 0
SUM OF ALL RESPONSES TO PHQ QUESTIONS 1-9: 0
SUM OF ALL RESPONSES TO PHQ9 QUESTIONS 1 & 2: 0
2. FEELING DOWN, DEPRESSED OR HOPELESS: 0
SUM OF ALL RESPONSES TO PHQ QUESTIONS 1-9: 0

## 2022-07-08 VITALS
DIASTOLIC BLOOD PRESSURE: 80 MMHG | HEART RATE: 63 BPM | OXYGEN SATURATION: 97 % | WEIGHT: 259 LBS | RESPIRATION RATE: 16 BRPM | SYSTOLIC BLOOD PRESSURE: 130 MMHG | HEIGHT: 72 IN | BODY MASS INDEX: 35.08 KG/M2 | TEMPERATURE: 97.7 F

## 2022-08-17 ENCOUNTER — HOSPITAL ENCOUNTER (OUTPATIENT)
Dept: GENERAL RADIOLOGY | Age: 57
Discharge: HOME | End: 2022-08-17
Payer: COMMERCIAL

## 2022-08-17 ENCOUNTER — NURSE ONLY (OUTPATIENT)
Dept: LAB | Age: 57
End: 2022-08-17

## 2022-08-17 ENCOUNTER — HOSPITAL ENCOUNTER (OUTPATIENT)
Age: 57
Discharge: HOME | End: 2022-08-17
Payer: COMMERCIAL

## 2022-08-17 ENCOUNTER — OFFICE VISIT (OUTPATIENT)
Dept: FAMILY MEDICINE CLINIC | Age: 57
End: 2022-08-17
Payer: COMMERCIAL

## 2022-08-17 VITALS
BODY MASS INDEX: 34.27 KG/M2 | WEIGHT: 253 LBS | HEIGHT: 72 IN | OXYGEN SATURATION: 95 % | HEART RATE: 80 BPM | DIASTOLIC BLOOD PRESSURE: 86 MMHG | RESPIRATION RATE: 16 BRPM | SYSTOLIC BLOOD PRESSURE: 134 MMHG | TEMPERATURE: 98.1 F

## 2022-08-17 DIAGNOSIS — R09.89 BRUIT OF RIGHT CAROTID ARTERY: ICD-10-CM

## 2022-08-17 DIAGNOSIS — R53.83 OTHER FATIGUE: ICD-10-CM

## 2022-08-17 DIAGNOSIS — E79.0 ELEVATED BLOOD URIC ACID LEVEL: ICD-10-CM

## 2022-08-17 DIAGNOSIS — G89.29 CHRONIC LEFT HIP PAIN: Primary | ICD-10-CM

## 2022-08-17 DIAGNOSIS — G89.29 CHRONIC LEFT HIP PAIN: ICD-10-CM

## 2022-08-17 DIAGNOSIS — M25.552 CHRONIC LEFT HIP PAIN: ICD-10-CM

## 2022-08-17 DIAGNOSIS — M25.552 CHRONIC LEFT HIP PAIN: Primary | ICD-10-CM

## 2022-08-17 PROCEDURE — 73502 X-RAY EXAM HIP UNI 2-3 VIEWS: CPT

## 2022-08-17 PROCEDURE — 99214 OFFICE O/P EST MOD 30 MIN: CPT | Performed by: FAMILY MEDICINE

## 2022-08-17 NOTE — PROGRESS NOTES
[address]  [address] [place] [address]  Phone:  [phone]          Name: Forest Rolon  : 1965    Chief Complaint   Patient presents with    Neck Pain     Hip Pain       HPI:     Forest Rolon is a 61 y.o. male who presents today for evaluation of neck and hip pain. 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 He has normal ROM. He's had pain in his left hip for months but it's been worse recently with no inciting event. He has some pain in his left knee as well. He's been following with a chiropractor. Massage therapy isn't working. He saw Dr. Melecio Tolentino in the past who told him he has gout. He'd like to get his uric acid level rechecked. Current Outpatient Medications:     losartan (COZAAR) 50 MG tablet, take 1 tablet by mouth once daily, Disp: 90 tablet, Rfl: 1    diclofenac sodium (VOLTAREN) 1 % GEL, Apply 4 g topically 4 times daily, Disp: 50 g, Rfl: 2    hydroCHLOROthiazide (HYDRODIURIL) 25 MG tablet, Take 1 tablet by mouth daily, Disp: 90 tablet, Rfl: 1    predniSONE (DELTASONE) 10 MG tablet, Take 1 tablet by mouth daily Take 5 tabs daily for 3 days, then 4 tabs daily for 3 days, then 3 tabs daily for 3 days, then 2 tabs daily for 3 days, then 1 tab daily for 3 days. , Disp: 45 tablet, Rfl: 0    meloxicam (MOBIC) 15 MG tablet, Take 1 tablet by mouth daily, Disp: 90 tablet, Rfl: 0    vitamin B-12 (CYANOCOBALAMIN) 1000 MCG tablet, Take 1,000 mcg by mouth daily, Disp: , Rfl:     Cholecalciferol (D3 PO), Take by mouth, Disp: , Rfl:     ZINC PO, Take by mouth daily, Disp: , Rfl:     Digestive Enzymes (DIGESTIVE ENZYME PO), Take by mouth daily, Disp: , Rfl:     Coenzyme Q10 (CO Q 10) 100 MG CAPS, Take by mouth, Disp: , Rfl:     Multiple Vitamin (MULTI VITAMIN MENS PO), Take  by mouth., Disp: , Rfl:     Omega-3 Fatty Acids (FISH OIL) 1200 MG CAPS, Take  by mouth daily.   , Disp: , Rfl:     No Known Allergies    Subjective:      Review of Systems   Constitutional:  Positive for fatigue. Musculoskeletal:  Positive for arthralgias, myalgias and neck pain. Negative for neck stiffness. Neurological:  Negative for weakness and numbness. Objective:     /86 (Site: Left Upper Arm, Position: Sitting)   Pulse 80   Temp 98.1 °F (36.7 °C) (Temporal)   Resp 16   Ht 6' 0.01\" (1.829 m)   Wt 253 lb (114.8 kg)   SpO2 95%   BMI 34.31 kg/m²     Physical Exam  Vitals and nursing note reviewed. Constitutional:       Appearance: He is well-developed. HENT:      Head: Normocephalic and atraumatic. Eyes:      Conjunctiva/sclera: Conjunctivae normal.   Cardiovascular:      Rate and Rhythm: Normal rate and regular rhythm. Heart sounds: Normal heart sounds. Pulmonary:      Effort: Pulmonary effort is normal. No respiratory distress. Breath sounds: Normal breath sounds. Abdominal:      General: Bowel sounds are normal.      Palpations: Abdomen is soft. Tenderness: There is no abdominal tenderness. Musculoskeletal:      Cervical back: Normal range of motion and neck supple. Skin:     General: Skin is warm and dry. Neurological:      Mental Status: He is alert and oriented to person, place, and time. Motor: No abnormal muscle tone. Assessment/Plan:     Julianna Levi was seen today for neck pain. Diagnoses and all orders for this visit:    Chronic left hip pain  -    Will check xrays for further evaluation. He will get muscle relaxants from his chiropractor's office. -    XR HIP 2-3 VW W PELVIS LEFT; Future    Elevated blood uric acid level  -     Uric Acid; Future    Other fatigue  -     Testosterone; Future      Return in about 6 months (around 2/17/2023) for hypertension.     Electronically signed by Rory Alex MD on 8/17/2022 at 12:02 PM

## 2022-08-18 ENCOUNTER — HOSPITAL ENCOUNTER (OUTPATIENT)
Dept: INTERVENTIONAL RADIOLOGY/VASCULAR | Age: 57
Discharge: HOME | End: 2022-08-18
Payer: COMMERCIAL

## 2022-08-18 DIAGNOSIS — R09.89 BRUIT OF RIGHT CAROTID ARTERY: ICD-10-CM

## 2022-08-18 LAB — URIC ACID: 7.1 MG/DL (ref 3.7–7)

## 2022-08-18 PROCEDURE — 93880 EXTRACRANIAL BILAT STUDY: CPT

## 2022-08-19 LAB — TESTOSTERONE TOTAL: 343 NG/DL (ref 300–890)

## 2022-11-02 RX ORDER — MELOXICAM 15 MG/1
15 TABLET ORAL DAILY
Qty: 90 TABLET | Refills: 0 | Status: SHIPPED | OUTPATIENT
Start: 2022-11-02 | End: 2022-11-08 | Stop reason: SDUPTHER

## 2022-11-06 DIAGNOSIS — I10 ESSENTIAL HYPERTENSION: ICD-10-CM

## 2022-11-07 DIAGNOSIS — E79.0 ELEVATED BLOOD URIC ACID LEVEL: ICD-10-CM

## 2022-11-07 DIAGNOSIS — Z00.00 WELLNESS EXAMINATION: Primary | ICD-10-CM

## 2022-11-07 RX ORDER — HYDROCHLOROTHIAZIDE 25 MG/1
TABLET ORAL
Qty: 90 TABLET | Refills: 1 | Status: SHIPPED | OUTPATIENT
Start: 2022-11-07 | End: 2022-11-08 | Stop reason: SDUPTHER

## 2022-11-08 DIAGNOSIS — I10 ESSENTIAL HYPERTENSION: ICD-10-CM

## 2022-11-08 RX ORDER — HYDROCHLOROTHIAZIDE 25 MG/1
25 TABLET ORAL DAILY
Qty: 90 TABLET | Refills: 1 | Status: SHIPPED | OUTPATIENT
Start: 2022-11-08

## 2022-11-08 RX ORDER — LOSARTAN POTASSIUM 50 MG/1
50 TABLET ORAL DAILY
Qty: 90 TABLET | Refills: 1 | Status: SHIPPED | OUTPATIENT
Start: 2022-11-08

## 2022-11-08 RX ORDER — MELOXICAM 15 MG/1
15 TABLET ORAL DAILY
Qty: 90 TABLET | Refills: 0 | Status: SHIPPED | OUTPATIENT
Start: 2022-11-08

## 2022-11-09 ENCOUNTER — NURSE ONLY (OUTPATIENT)
Dept: LAB | Age: 57
End: 2022-11-09

## 2022-11-09 DIAGNOSIS — Z00.00 WELLNESS EXAMINATION: ICD-10-CM

## 2022-11-09 DIAGNOSIS — E79.0 ELEVATED BLOOD URIC ACID LEVEL: ICD-10-CM

## 2022-11-09 LAB
ALBUMIN SERPL-MCNC: 4.5 G/DL (ref 3.5–5.1)
ALP BLD-CCNC: 44 U/L (ref 38–126)
ALT SERPL-CCNC: 24 U/L (ref 11–66)
ANION GAP SERPL CALCULATED.3IONS-SCNC: 12 MEQ/L (ref 8–16)
AST SERPL-CCNC: 25 U/L (ref 5–40)
BILIRUB SERPL-MCNC: 0.6 MG/DL (ref 0.3–1.2)
BUN BLDV-MCNC: 27 MG/DL (ref 7–22)
CALCIUM SERPL-MCNC: 9.6 MG/DL (ref 8.5–10.5)
CHLORIDE BLD-SCNC: 100 MEQ/L (ref 98–111)
CHOLESTEROL, TOTAL: 201 MG/DL (ref 100–199)
CO2: 28 MEQ/L (ref 23–33)
CREAT SERPL-MCNC: 1.1 MG/DL (ref 0.4–1.2)
GFR SERPL CREATININE-BSD FRML MDRD: > 60 ML/MIN/1.73M2
GLUCOSE BLD-MCNC: 91 MG/DL (ref 70–108)
HDLC SERPL-MCNC: 48 MG/DL
LDL CHOLESTEROL CALCULATED: 130 MG/DL
POTASSIUM SERPL-SCNC: 4.4 MEQ/L (ref 3.5–5.2)
SODIUM BLD-SCNC: 140 MEQ/L (ref 135–145)
TOTAL PROTEIN: 6.7 G/DL (ref 6.1–8)
TRIGL SERPL-MCNC: 115 MG/DL (ref 0–199)
URIC ACID: 6.8 MG/DL (ref 3.7–7)

## 2022-11-11 LAB — TESTOSTERONE TOTAL: 515 NG/DL (ref 300–890)

## 2022-11-16 ENCOUNTER — PATIENT MESSAGE (OUTPATIENT)
Dept: FAMILY MEDICINE CLINIC | Age: 57
End: 2022-11-16

## 2022-11-16 NOTE — TELEPHONE ENCOUNTER
From: Magno Cochran  To: Dr. Kerr Sin2022 11:58 AM EST  Subject: Blood work from     Can you send a copy or email to [email or web address]

## 2022-12-21 DIAGNOSIS — I10 ESSENTIAL HYPERTENSION: ICD-10-CM

## 2022-12-21 RX ORDER — HYDROCHLOROTHIAZIDE 25 MG/1
25 TABLET ORAL DAILY
Qty: 90 TABLET | Refills: 1 | Status: SHIPPED | OUTPATIENT
Start: 2022-12-21

## 2022-12-21 RX ORDER — MELOXICAM 15 MG/1
15 TABLET ORAL DAILY
Qty: 90 TABLET | Refills: 0 | Status: SHIPPED | OUTPATIENT
Start: 2022-12-21

## 2022-12-21 RX ORDER — LOSARTAN POTASSIUM 50 MG/1
50 TABLET ORAL DAILY
Qty: 90 TABLET | Refills: 1 | Status: SHIPPED | OUTPATIENT
Start: 2022-12-21

## 2022-12-21 NOTE — TELEPHONE ENCOUNTER
Refill pending leaving for vacation 1/6 and would like if dr Gardenia Joseph could fill prescriptions early 1/4 before they leave since he will be running out during vacation

## 2023-01-17 ENCOUNTER — TELEPHONE (OUTPATIENT)
Dept: FAMILY MEDICINE CLINIC | Age: 58
End: 2023-01-17

## 2023-01-17 RX ORDER — HYDROCORTISONE ACETATE 25 MG/1
25 SUPPOSITORY RECTAL EVERY 12 HOURS
Qty: 20 SUPPOSITORY | Refills: 1 | Status: SHIPPED | OUTPATIENT
Start: 2023-01-17

## 2023-01-17 NOTE — TELEPHONE ENCOUNTER
Pt called stating his hemorrhoids started to flare up yesterday and requesting meds.   Seen March 2020 for this    Pt is in [place] moving [place] daughter    [address] (pharmacy entered)

## 2023-02-28 ENCOUNTER — TELEPHONE (OUTPATIENT)
Dept: FAMILY MEDICINE CLINIC | Age: 58
End: 2023-02-28

## 2023-02-28 DIAGNOSIS — Z00.00 HEALTHCARE MAINTENANCE: Primary | ICD-10-CM

## 2023-02-28 NOTE — TELEPHONE ENCOUNTER
Pt wanting blood work done before apt 3/7 and then to be faxed to new [facility] in [place]

## 2023-03-01 ENCOUNTER — NURSE ONLY (OUTPATIENT)
Dept: LAB | Age: 58
End: 2023-03-01

## 2023-03-01 DIAGNOSIS — I10 ESSENTIAL HYPERTENSION: ICD-10-CM

## 2023-03-01 DIAGNOSIS — Z00.00 HEALTHCARE MAINTENANCE: ICD-10-CM

## 2023-03-01 LAB
ALBUMIN SERPL BCG-MCNC: 4.4 G/DL (ref 3.5–5.1)
ALP SERPL-CCNC: 44 U/L (ref 38–126)
ALT SERPL W/O P-5'-P-CCNC: 28 U/L (ref 11–66)
ANION GAP SERPL CALC-SCNC: 11 MEQ/L (ref 8–16)
AST SERPL-CCNC: 28 U/L (ref 5–40)
BASOPHILS ABSOLUTE: 0 THOU/MM3 (ref 0–0.1)
BASOPHILS NFR BLD AUTO: 0.8 %
BILIRUB SERPL-MCNC: 0.6 MG/DL (ref 0.3–1.2)
BUN SERPL-MCNC: 28 MG/DL (ref 7–22)
CALCIUM SERPL-MCNC: 9.6 MG/DL (ref 8.5–10.5)
CHLORIDE SERPL-SCNC: 102 MEQ/L (ref 98–111)
CHOLEST SERPL-MCNC: 201 MG/DL (ref 100–199)
CO2 SERPL-SCNC: 27 MEQ/L (ref 23–33)
CREAT SERPL-MCNC: 1 MG/DL (ref 0.4–1.2)
DEPRECATED RDW RBC AUTO: 40.9 FL (ref 35–45)
EOSINOPHIL NFR BLD AUTO: 4.1 %
EOSINOPHILS ABSOLUTE: 0.2 THOU/MM3 (ref 0–0.4)
ERYTHROCYTE [DISTWIDTH] IN BLOOD BY AUTOMATED COUNT: 12 % (ref 11.5–14.5)
GFR SERPL CREATININE-BSD FRML MDRD: > 60 ML/MIN/1.73M2
GLUCOSE SERPL-MCNC: 106 MG/DL (ref 70–108)
HCT VFR BLD AUTO: 44.6 % (ref 42–52)
HDLC SERPL-MCNC: 49 MG/DL
HGB BLD-MCNC: 14.7 GM/DL (ref 14–18)
IMM GRANULOCYTES # BLD AUTO: 0.02 THOU/MM3 (ref 0–0.07)
IMM GRANULOCYTES NFR BLD AUTO: 0.3 %
LDLC SERPL CALC-MCNC: 129 MG/DL
LYMPHOCYTES ABSOLUTE: 1.5 THOU/MM3 (ref 1–4.8)
LYMPHOCYTES NFR BLD AUTO: 25 %
MCH RBC QN AUTO: 30.5 PG (ref 26–33)
MCHC RBC AUTO-ENTMCNC: 33 GM/DL (ref 32.2–35.5)
MCV RBC AUTO: 92.5 FL (ref 80–94)
MONOCYTES ABSOLUTE: 0.9 THOU/MM3 (ref 0.4–1.3)
MONOCYTES NFR BLD AUTO: 15.2 %
NEUTROPHILS NFR BLD AUTO: 54.6 %
NRBC BLD AUTO-RTO: 0 /100 WBC
PLATELET # BLD AUTO: 244 THOU/MM3 (ref 130–400)
PMV BLD AUTO: 10.1 FL (ref 9.4–12.4)
POTASSIUM SERPL-SCNC: 4.6 MEQ/L (ref 3.5–5.2)
PROT SERPL-MCNC: 6.6 G/DL (ref 6.1–8)
RBC # BLD AUTO: 4.82 MILL/MM3 (ref 4.7–6.1)
SEGMENTED NEUTROPHILS ABSOLUTE COUNT: 3.3 THOU/MM3 (ref 1.8–7.7)
SODIUM SERPL-SCNC: 140 MEQ/L (ref 135–145)
TRIGL SERPL-MCNC: 113 MG/DL (ref 0–199)
WBC # BLD AUTO: 6 THOU/MM3 (ref 4.8–10.8)

## 2023-03-02 ASSESSMENT — ENCOUNTER SYMPTOMS
VOMITING: 0
SHORTNESS OF BREATH: 0
COLOR CHANGE: 0
EYE REDNESS: 0
NAUSEA: 0

## 2023-03-02 NOTE — PROGRESS NOTES
[address]  [address] [place] [address]  Phone:  [phone]         Chase Hilton       Name: Magdy Lockwood  : 1965          Chief Complaint:     Chief Complaint   Patient presents with    Annual Exam    Chest Congestion       History of Present Illness:      Magdy Lockwood is a 61 y.o.  male who presents today for a health maintenance examination. He's been doing [employer] through [employer] and is down 27 lbs. He's been sick the past week with congestion, cough, and chest tightness. His throat is sore from the drainage. No otalgia. No fevers. No nausea, vomiting, or diarrhea.     Lab Results   Component Value Date    CHOL 201 (H) 2023    TRIG 113 2023    HDL 49 2023    LDLCALC 129 2023     Lab Results   Component Value Date    ALT 28 2023    AST 28 2023        Lab Results   Component Value Date/Time     2023 06:04 AM    K 4.6 2023 06:04 AM     2023 06:04 AM    CO2 27 2023 06:04 AM    BUN 28 2023 06:04 AM    CREATININE 1.0 2023 06:04 AM    GLUCOSE 106 2023 06:04 AM    CALCIUM 9.6 2023 06:04 AM        Health Maintenance  Smoker?:  No  Healthy diet?:  No  Routine exercise?:  Yes  Routine dental exams?:  Yes  Routine eye exams?:  No  Last colon cancer screening:  Colonoscopy with Dr. [place] Roberts       Past Medical History:     Past Medical History:   Diagnosis Date    Chronic back pain     Essential hypertension     Internal hemorrhoids 2015    per colonoscopy        Past Surgical History:     Past Surgical History:   Procedure Laterality Date    CERVICAL FUSION      JOINT REPLACEMENT Right 2021    knee    SKULL FRACTURE ELEVATION      plate placed        Medications:       Outpatient Medications Prior to Visit   Medication Sig Dispense Refill    vitamin B-12 (CYANOCOBALAMIN) 1000 MCG tablet Take 1,000 mcg by mouth daily      Cholecalciferol (D3 PO) Take by mouth ZINC PO Take by mouth daily      Multiple Vitamin (MULTI VITAMIN MENS PO) Take  by mouth. Omega-3 Fatty Acids (FISH OIL) 1200 MG CAPS Take  by mouth daily. hydrocortisone (ANUSOL-HC) 25 MG suppository Place 1 suppository rectally in the morning and 1 suppository in the evening. (Patient not taking: Reported on 3/7/2023) 20 suppository 1    meloxicam (MOBIC) 15 MG tablet Take 1 tablet by mouth daily (Patient not taking: Reported on 3/7/2023) 90 tablet 0    hydroCHLOROthiazide (HYDRODIURIL) 25 MG tablet Take 1 tablet by mouth daily 90 tablet 1    losartan (COZAAR) 50 MG tablet Take 1 tablet by mouth daily 90 tablet 1    diclofenac sodium (VOLTAREN) 1 % GEL Apply 4 g topically 4 times daily (Patient not taking: Reported on 3/7/2023) 50 g 2    predniSONE (DELTASONE) 10 MG tablet Take 1 tablet by mouth daily Take 5 tabs daily for 3 days, then 4 tabs daily for 3 days, then 3 tabs daily for 3 days, then 2 tabs daily for 3 days, then 1 tab daily for 3 days. (Patient not taking: Reported on 3/7/2023) 45 tablet 0    Digestive Enzymes (DIGESTIVE ENZYME PO) Take by mouth daily (Patient not taking: Reported on 3/7/2023)      Coenzyme Q10 (CO Q 10) 100 MG CAPS Take by mouth (Patient not taking: Reported on 3/7/2023)       No facility-administered medications prior to visit. Allergies:       Patient has no known allergies. Social History:     Social:          Social History     Socioeconomic History    Marital status:      Spouse name: Britt    Number of children: Not on file    Years of education: Not on file    Highest education level: Not on file   Occupational History    Not on file   Tobacco Use    Smoking status: Former     Packs/day: 1.50     Years: 10.00     Pack years: 15.00     Types: Cigarettes     Quit date: 2/15/1994     Years since quittin.0    Smokeless tobacco: Never   Substance and Sexual Activity    Alcohol use:  Yes     Alcohol/week: 2.0 standard drinks     Types: 2 Cans of beer per week     Comment: social    Drug use: No    Sexual activity: Yes     Partners: Female   Other Topics Concern    Not on file   Social History Narrative    Not on file     Social Determinants of Health     Financial Resource Strain: Unknown    Difficulty of Paying Living Expenses: Patient refused   Food Insecurity: Unknown    Worried About Running Out of Food in the Last Year: Patient refused    [address] in the Last Year: Patient refused   Transportation Needs: Unknown    Lack of Transportation (Medical): Not on file    Lack of Transportation (Non-Medical): Patient refused   Physical Activity: Not on file   Stress: Not on file   Social Connections: Not on file   Intimate Partner Violence: Not on file   Housing Stability: Unknown    Unable to Pay for Housing in the Last Year: Not on file    Number of [place] in the Last Year: Not on file    Unstable Housing in the Last Year: Patient refused       Family History:     Family History   Problem Relation Age of Onset    Heart Attack Father     Heart Disease Father        Review of Systems:     Review of Systems   Constitutional:  Positive for fatigue. Negative for chills and fever. HENT:  Positive for congestion, rhinorrhea and sore throat. Eyes:  Negative for redness and visual disturbance. Respiratory:  Positive for cough. Negative for shortness of breath. Cardiovascular:  Negative for chest pain and palpitations. Gastrointestinal:  Negative for blood in stool, constipation, diarrhea, nausea and vomiting. Genitourinary:  Negative for hematuria. Musculoskeletal:  Positive for arthralgias. Negative for myalgias. Skin:  Negative for color change and rash. Neurological:  Negative for headaches. Psychiatric/Behavioral:  Positive for sleep disturbance. Negative for dysphoric mood. The patient is not nervous/anxious.       Physical Exam:     Vitals:  Blood pressure 128/80, pulse 79, temperature 98 °F (36.7 °C), temperature source Temporal, resp. rate 17, height 6' 0.01\" (1.829 m), weight 230 lb (104.3 kg), SpO2 95 %. General appearance:  Alert, well appearing, and in no acute distress. Mental status:  Oriented to person, place, and time with normal affect. Head:  Normocephalic and atraumatic. Eyes:   Extraocular eye movements intact, conjunctiva clear. Ears:  Hearing appears to be intact. Nose:  No drainage noted. Mouth:  Mucous membranes moist.  Neck:  Supple, no carotid bruits, thyroid not palpable. Heart:  Normal rate, regular rhythm, no murmurs. Lungs:  Clear to auscultation bilaterally. Respirations unlabored. Abdomen:  Soft, nondistended, bowel sounds present all four quadrants. Neurological:  Normal speech. Extremities:  Peripheral pulses palpable, no pedal edema. Skin:  No gross lesions, rashes, or induration noted. Assessment:      Diagnosis Orders   1. Well adult exam        2. Essential hypertension  hydroCHLOROthiazide (HYDRODIURIL) 25 MG tablet    losartan (COZAAR) 50 MG tablet      3. Acute bacterial sinusitis  cefTRIAXone (ROCEPHIN) injection 1,000 mg    predniSONE (DELTASONE) 20 MG tablet          Plan:     Orders Placed This Encounter   Medications    hydroCHLOROthiazide (HYDRODIURIL) 25 MG tablet     Sig: Take 1 tablet by mouth daily     Dispense:  90 tablet     Refill:  1    losartan (COZAAR) 50 MG tablet     Sig: Take 1 tablet by mouth daily     Dispense:  90 tablet     Refill:  1    meloxicam (MOBIC) 15 MG tablet     Sig: Take 1 tablet by mouth daily     Dispense:  90 tablet     Refill:  1    cefTRIAXone (ROCEPHIN) injection 1,000 mg     Order Specific Question:   Antimicrobial Indications     Answer:    Other     Order Specific Question:   Other Abx Indication     Answer:   sinus infection    predniSONE (DELTASONE) 20 MG tablet     Sig: Take 2 tablets by mouth daily     Dispense:  10 tablet     Refill:  0     Counseling Completed:  Tobacco and secondary smoke risks reviewed; instructed on cessation and avoidance. Colon cancer screening reviewed age > 48, or < if indicated. Labs ordered, if indicated. Influenza vaccine recommended, if in season. Pneumonia vaccination if > 65 or indicated. Routine eye and dental exams advised. Exercise and healthy diet discussed. Return in about 6 months (around 9/7/2023) for hypertension.     Electronically signed by Raymona Merlin, MD on 3/7/2023 at 3:51 PM

## 2023-03-07 ENCOUNTER — OFFICE VISIT (OUTPATIENT)
Dept: FAMILY MEDICINE CLINIC | Age: 58
End: 2023-03-07
Payer: COMMERCIAL

## 2023-03-07 VITALS
RESPIRATION RATE: 17 BRPM | HEART RATE: 79 BPM | WEIGHT: 230 LBS | OXYGEN SATURATION: 95 % | TEMPERATURE: 98 F | DIASTOLIC BLOOD PRESSURE: 80 MMHG | SYSTOLIC BLOOD PRESSURE: 128 MMHG | BODY MASS INDEX: 31.15 KG/M2 | HEIGHT: 72 IN

## 2023-03-07 DIAGNOSIS — B96.89 ACUTE BACTERIAL SINUSITIS: ICD-10-CM

## 2023-03-07 DIAGNOSIS — Z00.00 WELL ADULT EXAM: Primary | ICD-10-CM

## 2023-03-07 DIAGNOSIS — I10 ESSENTIAL HYPERTENSION: ICD-10-CM

## 2023-03-07 DIAGNOSIS — J01.90 ACUTE BACTERIAL SINUSITIS: ICD-10-CM

## 2023-03-07 PROCEDURE — 99396 PREV VISIT EST AGE 40-64: CPT | Performed by: FAMILY MEDICINE

## 2023-03-07 PROCEDURE — 3074F SYST BP LT 130 MM HG: CPT | Performed by: FAMILY MEDICINE

## 2023-03-07 PROCEDURE — 3079F DIAST BP 80-89 MM HG: CPT | Performed by: FAMILY MEDICINE

## 2023-03-07 RX ORDER — HYDROCHLOROTHIAZIDE 25 MG/1
25 TABLET ORAL DAILY
Qty: 90 TABLET | Refills: 1 | Status: SHIPPED | OUTPATIENT
Start: 2023-03-07

## 2023-03-07 RX ORDER — CEFTRIAXONE 500 MG/1
1000 INJECTION, POWDER, FOR SOLUTION INTRAMUSCULAR; INTRAVENOUS ONCE
Status: COMPLETED | OUTPATIENT
Start: 2023-03-07 | End: 2023-03-08

## 2023-03-07 RX ORDER — PREDNISONE 20 MG/1
40 TABLET ORAL DAILY
Qty: 10 TABLET | Refills: 0 | Status: SHIPPED | OUTPATIENT
Start: 2023-03-07

## 2023-03-07 RX ORDER — LOSARTAN POTASSIUM 50 MG/1
50 TABLET ORAL DAILY
Qty: 90 TABLET | Refills: 1 | Status: SHIPPED | OUTPATIENT
Start: 2023-03-07

## 2023-03-07 RX ORDER — MELOXICAM 15 MG/1
15 TABLET ORAL DAILY
Qty: 90 TABLET | Refills: 1 | Status: SHIPPED | OUTPATIENT
Start: 2023-03-07

## 2023-03-07 SDOH — ECONOMIC STABILITY: FOOD INSECURITY: WITHIN THE PAST 12 MONTHS, YOU WORRIED THAT YOUR FOOD WOULD RUN OUT BEFORE YOU GOT MONEY TO BUY MORE.: PATIENT DECLINED

## 2023-03-07 SDOH — ECONOMIC STABILITY: INCOME INSECURITY: HOW HARD IS IT FOR YOU TO PAY FOR THE VERY BASICS LIKE FOOD, HOUSING, MEDICAL CARE, AND HEATING?: PATIENT DECLINED

## 2023-03-07 SDOH — ECONOMIC STABILITY: HOUSING INSECURITY
IN THE LAST 12 MONTHS, WAS THERE A TIME WHEN YOU DID NOT HAVE A STEADY PLACE TO SLEEP OR SLEPT IN A SHELTER (INCLUDING NOW)?: PATIENT REFUSED

## 2023-03-07 SDOH — ECONOMIC STABILITY: FOOD INSECURITY: WITHIN THE PAST 12 MONTHS, THE FOOD YOU BOUGHT JUST DIDN'T LAST AND YOU DIDN'T HAVE MONEY TO GET MORE.: PATIENT DECLINED

## 2023-03-07 ASSESSMENT — ENCOUNTER SYMPTOMS
DIARRHEA: 0
SORE THROAT: 1
CONSTIPATION: 0
COUGH: 1
BLOOD IN STOOL: 0
RHINORRHEA: 1

## 2023-03-07 ASSESSMENT — PATIENT HEALTH QUESTIONNAIRE - PHQ9
SUM OF ALL RESPONSES TO PHQ QUESTIONS 1-9: 0
2. FEELING DOWN, DEPRESSED OR HOPELESS: 0
SUM OF ALL RESPONSES TO PHQ9 QUESTIONS 1 & 2: 0
SUM OF ALL RESPONSES TO PHQ QUESTIONS 1-9: 0
1. LITTLE INTEREST OR PLEASURE IN DOING THINGS: 0

## 2023-03-08 PROCEDURE — 96372 THER/PROPH/DIAG INJ SC/IM: CPT | Performed by: FAMILY MEDICINE

## 2023-03-08 RX ADMIN — CEFTRIAXONE 1000 MG: 500 INJECTION, POWDER, FOR SOLUTION INTRAMUSCULAR; INTRAVENOUS at 08:08

## 2023-03-08 NOTE — PROGRESS NOTES
Administrations This Visit       cefTRIAXone (ROCEPHIN) injection 1,000 mg       Admin Date  03/08/2023  08:08 Action  Given Dose  1,000 mg Route  IntraMUSCular Site  Ventrogluteal Left  Administered By  Kareem Lozano LPN    Ordering Provider: Greg Reyna MD    NDC: 7424-3086-54    Lot#: [id number]    : REGINALD/ Christina 9    Patient Supplied?: No                    Patient instructed to remain in clinic for 20 minutes after injection and was advised to report any adverse reaction to me immediately.

## 2023-05-20 LAB
ALBUMIN SERPL-MCNC: 4.4 G/DL
ALP BLD-CCNC: 46 U/L
ALT SERPL-CCNC: 25 U/L
ANION GAP SERPL CALCULATED.3IONS-SCNC: 1.8 MMOL/L
AST SERPL-CCNC: 23 U/L
BASOPHILS ABSOLUTE: NORMAL
BASOPHILS RELATIVE PERCENT: 0.6 %
BILIRUB SERPL-MCNC: 0.8 MG/DL (ref 0.1–1.4)
BILIRUBIN DIRECT: 0.1 MG/DL
BUN BLDV-MCNC: 23 MG/DL
CALCIUM SERPL-MCNC: 9.6 MG/DL
CHLORIDE BLD-SCNC: 101 MMOL/L
CHOLESTEROL, TOTAL: 180 MG/DL
CHOLESTEROL/HDL RATIO: 2.4
CO2: 31 MMOL/L
CREAT SERPL-MCNC: 1 MG/DL
EGFR: 76
EOSINOPHILS ABSOLUTE: 0.3 /ΜL
EOSINOPHILS RELATIVE PERCENT: 4.3 %
GLUCOSE BLD-MCNC: 102 MG/DL
HCT VFR BLD CALC: 44 % (ref 41–53)
HDLC SERPL-MCNC: 46 MG/DL (ref 35–70)
HEMOGLOBIN: 15 G/DL (ref 13.5–17.5)
LDL CHOLESTEROL CALCULATED: 108 MG/DL (ref 0–160)
LYMPHOCYTES ABSOLUTE: 1.4 /ΜL
LYMPHOCYTES RELATIVE PERCENT: 22.6 %
MCH RBC QN AUTO: 30.8 PG
MCHC RBC AUTO-ENTMCNC: 34.1 G/DL
MCV RBC AUTO: 90.3 FL
MONOCYTES ABSOLUTE: 1.2 /ΜL
MONOCYTES RELATIVE PERCENT: 18.9 %
NEUTROPHILS ABSOLUTE: 3.4 /ΜL
NEUTROPHILS RELATIVE PERCENT: 53.6 %
NONHDLC SERPL-MCNC: NORMAL MG/DL
PDW BLD-RTO: 12.6 %
PHOSPHORUS: 3.1 MG/DL
PLATELET # BLD: 226 K/ΜL
PMV BLD AUTO: 8.3 FL
POTASSIUM SERPL-SCNC: 4.1 MMOL/L
RBC # BLD: 43.87 10^6/ΜL
SODIUM BLD-SCNC: 139 MMOL/L
TOTAL PROTEIN: 6.9
TRIGL SERPL-MCNC: 129 MG/DL
URIC ACID: 6.5
VLDLC SERPL CALC-MCNC: 6.5 MG/DL
WBC # BLD: 6.3 10^3/ML

## 2023-11-13 DIAGNOSIS — I10 ESSENTIAL HYPERTENSION: ICD-10-CM

## 2023-11-13 RX ORDER — HYDROCHLOROTHIAZIDE 25 MG/1
25 TABLET ORAL DAILY
Qty: 90 TABLET | Refills: 1 | Status: SHIPPED | OUTPATIENT
Start: 2023-11-13

## 2023-11-13 RX ORDER — LOSARTAN POTASSIUM 50 MG/1
50 TABLET ORAL DAILY
Qty: 90 TABLET | Refills: 1 | Status: SHIPPED | OUTPATIENT
Start: 2023-11-13

## 2023-11-27 NOTE — PROGRESS NOTES
[address] [address] [address] [facility]  Phone:  [phone]          Name: Karen Hernandez  : 1965    Chief Complaint   Patient presents with    Follow-up     HTN    Shoulder Pain     Right shoulder        HPI:     Karen Hernandez is a 60 y.o. male who presents today for follow up of hypertension. He has arthritis in his knees and needs his left knee replaced. He was scheduled to have it in September but is renovating a house next to his shop so postponed it. He has noticed some pain in his right shoulder after sleeping at night. Hypertension  This is a chronic problem. The current episode started more than 1 year ago. Pertinent negatives include no chest pain, headaches or palpitations. Risk factors for coronary artery disease include male gender. Past treatments include diuretics and angiotensin blockers.      Lab Results   Component Value Date/Time     2023 12:00 AM    K 4.1 2023 12:00 AM     2023 12:00 AM    CO2 31 2023 12:00 AM    BUN 23 2023 12:00 AM    CREATININE 1.0 2023 12:00 AM    GLUCOSE 102 2023 12:00 AM    CALCIUM 9.6 2023 12:00 AM    LABGLOM 76 2023 12:00 AM      Lab Results   Component Value Date    CHOL 180 2023    TRIG 129 2023    HDL 46 2023    LDLCALC 108 2023     Lab Results   Component Value Date    ALT 25 2023    AST 23 2023            Current Outpatient Medications:     hydroCHLOROthiazide (HYDRODIURIL) 25 MG tablet, Take 1 tablet by mouth daily, Disp: 90 tablet, Rfl: 1    losartan (COZAAR) 50 MG tablet, Take 1 tablet by mouth daily, Disp: 90 tablet, Rfl: 1    meloxicam (MOBIC) 15 MG tablet, Take 1 tablet by mouth daily, Disp: 90 tablet, Rfl: 1    vitamin B-12 (CYANOCOBALAMIN) 1000 MCG tablet, Take 1 tablet by mouth daily, Disp: , Rfl:     Cholecalciferol (D3 PO), Take by mouth, Disp: , Rfl:     ZINC PO, Take by mouth daily, Disp: , Rfl:

## 2023-11-28 ENCOUNTER — TELEPHONE (OUTPATIENT)
Dept: FAMILY MEDICINE CLINIC | Age: 58
End: 2023-11-28

## 2023-11-28 ENCOUNTER — OFFICE VISIT (OUTPATIENT)
Dept: FAMILY MEDICINE CLINIC | Age: 58
End: 2023-11-28
Payer: COMMERCIAL

## 2023-11-28 VITALS
BODY MASS INDEX: 32.14 KG/M2 | RESPIRATION RATE: 16 BRPM | HEART RATE: 58 BPM | DIASTOLIC BLOOD PRESSURE: 70 MMHG | SYSTOLIC BLOOD PRESSURE: 122 MMHG | WEIGHT: 237 LBS | TEMPERATURE: 98.6 F

## 2023-11-28 DIAGNOSIS — I10 ESSENTIAL HYPERTENSION: Primary | ICD-10-CM

## 2023-11-28 PROCEDURE — 3078F DIAST BP <80 MM HG: CPT | Performed by: FAMILY MEDICINE

## 2023-11-28 PROCEDURE — 99213 OFFICE O/P EST LOW 20 MIN: CPT | Performed by: FAMILY MEDICINE

## 2023-11-28 PROCEDURE — 3074F SYST BP LT 130 MM HG: CPT | Performed by: FAMILY MEDICINE

## 2023-11-28 NOTE — TELEPHONE ENCOUNTER
His glucose level is good and he can just continue to work on a healthy diet and stay active like he has been.

## 2023-11-28 NOTE — TELEPHONE ENCOUNTER
Patient forgot to ask about his labs when he was in the office for his appt today. He is asking if there is anything he needs to do or if diet and exercise will be enough. He is regarding the glucose, he knows it was only 1 point elevated. Please advise.

## 2023-12-27 ENCOUNTER — HOSPITAL ENCOUNTER (EMERGENCY)
Age: 58
Discharge: HOME | End: 2023-12-27
Payer: COMMERCIAL

## 2023-12-27 VITALS
TEMPERATURE: 97.7 F | SYSTOLIC BLOOD PRESSURE: 125 MMHG | BODY MASS INDEX: 31.15 KG/M2 | HEART RATE: 59 BPM | OXYGEN SATURATION: 97 % | DIASTOLIC BLOOD PRESSURE: 80 MMHG | HEIGHT: 72 IN | WEIGHT: 230 LBS | RESPIRATION RATE: 16 BRPM

## 2023-12-27 DIAGNOSIS — S61.412A LACERATION OF LEFT HAND WITHOUT FOREIGN BODY, INITIAL ENCOUNTER: Primary | ICD-10-CM

## 2023-12-27 PROCEDURE — 90471 IMMUNIZATION ADMIN: CPT | Performed by: NURSE PRACTITIONER

## 2023-12-27 PROCEDURE — 99213 OFFICE O/P EST LOW 20 MIN: CPT

## 2023-12-27 PROCEDURE — 6360000002 HC RX W HCPCS: Performed by: NURSE PRACTITIONER

## 2023-12-27 PROCEDURE — 12001 RPR S/N/AX/GEN/TRNK 2.5CM/<: CPT | Performed by: NURSE PRACTITIONER

## 2023-12-27 PROCEDURE — 2500000003 HC RX 250 WO HCPCS: Performed by: NURSE PRACTITIONER

## 2023-12-27 PROCEDURE — 6370000000 HC RX 637 (ALT 250 FOR IP): Performed by: NURSE PRACTITIONER

## 2023-12-27 PROCEDURE — 90715 TDAP VACCINE 7 YRS/> IM: CPT | Performed by: NURSE PRACTITIONER

## 2023-12-27 RX ORDER — BACITRACIN ZINC 500 [USP'U]/G
OINTMENT TOPICAL ONCE
Status: DISCONTINUED | OUTPATIENT
Start: 2023-12-27 | End: 2023-12-27

## 2023-12-27 RX ORDER — LIDOCAINE HYDROCHLORIDE 10 MG/ML
5 INJECTION, SOLUTION EPIDURAL; INFILTRATION; INTRACAUDAL; PERINEURAL ONCE
Status: COMPLETED | OUTPATIENT
Start: 2023-12-27 | End: 2023-12-27

## 2023-12-27 RX ORDER — GINSENG 100 MG
CAPSULE ORAL ONCE
Status: COMPLETED | OUTPATIENT
Start: 2023-12-27 | End: 2023-12-27

## 2023-12-27 RX ADMIN — BACITRACIN ZINC: 500 OINTMENT TOPICAL at 15:10

## 2023-12-27 RX ADMIN — LIDOCAINE HYDROCHLORIDE 5 ML: 10 INJECTION, SOLUTION EPIDURAL; INFILTRATION; INTRACAUDAL; PERINEURAL at 14:40

## 2023-12-27 RX ADMIN — TETANUS TOXOID, REDUCED DIPHTHERIA TOXOID AND ACELLULAR PERTUSSIS VACCINE, ADSORBED 0.5 ML: 5; 2.5; 8; 8; 2.5 SUSPENSION INTRAMUSCULAR at 14:00

## 2023-12-27 ASSESSMENT — PAIN SCALES - GENERAL
PAINLEVEL_OUTOF10: 0
PAINLEVEL_OUTOF10: 2

## 2023-12-27 ASSESSMENT — PAIN DESCRIPTION - DESCRIPTORS: DESCRIPTORS: ACHING

## 2023-12-27 ASSESSMENT — PAIN DESCRIPTION - PAIN TYPE: TYPE: ACUTE PAIN

## 2023-12-27 ASSESSMENT — PAIN - FUNCTIONAL ASSESSMENT
PAIN_FUNCTIONAL_ASSESSMENT: 0-10
PAIN_FUNCTIONAL_ASSESSMENT: 0-10

## 2023-12-27 ASSESSMENT — PAIN DESCRIPTION - ORIENTATION: ORIENTATION: LEFT

## 2023-12-27 ASSESSMENT — PAIN DESCRIPTION - LOCATION: LOCATION: HAND

## 2023-12-27 NOTE — ED NOTES
Suture site cleansed and patted dry. Provider unsuccessfully attempted to order multiple times bacitracin ointment packet to be applied topically to left hand suture line. This nurse found correct order and ordered as a verbal and applied to site per directions by provider. Previously ordered bacitracin tube orders discontinued. Large bandaid applied to left hand suture site. Reviewed discharge instructions with patient and follow up with occupational health. Voiced understanding.       Angelina Esquivel RN  12/27/23 2511

## 2023-12-27 NOTE — ED PROVIDER NOTES
[facility]  Urgent Care Encounter      CHIEF COMPLAINT       Chief Complaint   Patient presents with    Laceration     Laceration left palm using utility knife at work today at approximately 1245       Nurses Notes reviewed and I agree except as noted in the HPI.  HISTORY OF PRESENT ILLNESS   Jacobo Shoemaker is a 58 y.o. male who presents for evaluation to the laceration of the left hand.  Injury prior to arrival.  Patient was using a utility knife while at work to work on a door when he had accidentally cut himself causing injury.  Patient notes minimal pain to the area, rates 2/10.  No numbness/tingling.  He notes full range of motion.  Unsure of tetanus immunization.  Bleeding controlled prior to arrival.    REVIEW OF SYSTEMS     Review of Systems   Constitutional:  Negative for fatigue and fever.   Respiratory:  Negative for shortness of breath.    Cardiovascular:  Negative for chest pain.   Musculoskeletal:  Negative for arthralgias, joint swelling, neck pain and neck stiffness.   Skin:  Positive for wound.   Neurological:  Negative for weakness and numbness.   Hematological:  Does not bruise/bleed easily.       PAST MEDICAL HISTORY         Diagnosis Date    Chronic back pain     Essential hypertension     Internal hemorrhoids 2015    per colonoscopy       SURGICAL HISTORY     Patient  has a past surgical history that includes Skull fracture elevation (1997); cervical fusion; and joint replacement (Right, 01/2021).    CURRENT MEDICATIONS       Previous Medications    CHOLECALCIFEROL (D3 PO)    Take by mouth    HYDROCHLOROTHIAZIDE (HYDRODIURIL) 25 MG TABLET    Take 1 tablet by mouth daily    LOSARTAN (COZAAR) 50 MG TABLET    Take 1 tablet by mouth daily    MELOXICAM (MOBIC) 15 MG TABLET    Take 1 tablet by mouth daily    MULTIPLE VITAMIN (MULTI VITAMIN MENS PO)    Take  by mouth.    OMEGA-3 FATTY ACIDS (FISH OIL) 1200 MG CAPS    Take  by mouth daily.      VITAMIN B-12 (CYANOCOBALAMIN)  tendon damage noted      Contaminated: no    Treatment:     Area cleansed with:  Povidone-iodine    Amount of cleaning:  Standard    Debridement:  None    Undermining:  None  Skin repair:     Repair method:  Sutures    Suture size:  4-0    Suture material:  Nylon    Suture technique:  Simple interrupted    Number of sutures:  6  Approximation:     Approximation:  Close  Repair type:     Repair type:  Simple  Post-procedure details:     Dressing:  Non-adherent dressing and antibiotic ointment    Procedure completion:  Tolerated well, no immediate complications      FINAL IMPRESSION      1. Laceration of left hand without foreign body, initial encounter        DISPOSITION/PLAN   DISPOSITION Decision To Discharge 12/27/2023 02:50:26 PM    Patient presented for evaluation of laceration of the left palm that he sustained from a utility knife. I placed 6 simple interrupted sutures without difficulty. Patient tolerated well with no immediate complications. Bacitracin, dressing applied by nurse, tolerated well. No immediate complications. Patient to follow-up with occupational health in 1 week for removal.  Restricted duty starting tomorrow. Bacitracin daily over-the-counter. Keep area clean and dry, monitor for infection. If symptoms worsen go to ER. PATIENT REFERRED TO:  [address] [place] [id number]  [phone]    Follow-up as scheduled, sooner if needed. Keep area clean and dry. Monitor for infection. Daily antibiotic ointment. Restricted duty starting 12/28/2023. If symptoms worsen go to ER.     DISCHARGE MEDICATIONS:  New Prescriptions    No medications on file     Current Discharge Medication List          RAHUL Chapman CNP, APRN - CNP  12/27/23 4906

## 2023-12-27 NOTE — ED TRIAGE NOTES
Laceration left palm using utility knife at work today at approximately [phone]. Pt last Tdap is 2011.

## 2023-12-27 NOTE — ED NOTES
This nurse checked in [facility] drug screening for worker's compensation required for this patient's company.      Seng Villeda RN  12/27/23 1359

## 2023-12-28 ENCOUNTER — TELEPHONE (OUTPATIENT)
Dept: FAMILY MEDICINE CLINIC | Age: 58
End: 2023-12-28

## 2024-03-06 NOTE — PROGRESS NOTES
[facility]  [address]  [place] [address]  Phone:  [phone]         WELLNESS EXAM       Name: Jacobo Shoemaker  : 1965          Chief Complaint:     Chief Complaint   Patient presents with    Annual Exam       History of Present Illness:      Jacobo Shoemaker is a 58 y.o.  male who presents today for a health maintenance examination.  He has been having pain in his left shoulder and the left side of his neck.  If he leans forward he'll have tingling into his neck.  This has been present for the past month and it happens daily.  Last night he was in the crawl space and when he got out he had a headache.  He doesn't typically get headaches.  His dad  from heart disease so he's worried about that, but his pain is positional.    Lab Results   Component Value Date    CHOL 180 2023    TRIG 129 2023    HDL 46 2023    LDLCALC 108 2023     Lab Results   Component Value Date    ALT 25 2023    AST 23 2023        Lab Results   Component Value Date/Time     2023 12:00 AM    K 4.1 2023 12:00 AM     2023 12:00 AM    CO2 31 2023 12:00 AM    BUN 23 2023 12:00 AM    CREATININE 1.0 2023 12:00 AM    GLUCOSE 102 2023 12:00 AM    CALCIUM 9.6 2023 12:00 AM        Health Maintenance  Smoker?:  No  Healthy diet?:  No  Routine exercise?:  Yes  Routine dental exams?:  Yes  Routine eye exams?:  No, it's been a few years  Last colon cancer screening:  Colonoscopy with Dr. Esposito       Past Medical History:     Past Medical History:   Diagnosis Date    Chronic back pain     Essential hypertension     Internal hemorrhoids 2015    per colonoscopy        Past Surgical History:     Past Surgical History:   Procedure Laterality Date    CERVICAL FUSION      JOINT REPLACEMENT Right 2021    knee    SKULL FRACTURE ELEVATION  1997    plate placed        Medications:       Outpatient Medications Prior to Visit

## 2024-03-12 ENCOUNTER — OFFICE VISIT (OUTPATIENT)
Dept: FAMILY MEDICINE CLINIC | Age: 59
End: 2024-03-12
Payer: COMMERCIAL

## 2024-03-12 ENCOUNTER — HOSPITAL ENCOUNTER (OUTPATIENT)
Dept: GENERAL RADIOLOGY | Age: 59
Discharge: HOME | End: 2024-03-12
Attending: FAMILY MEDICINE
Payer: COMMERCIAL

## 2024-03-12 ENCOUNTER — HOSPITAL ENCOUNTER (OUTPATIENT)
Age: 59
Discharge: HOME | End: 2024-03-12
Payer: COMMERCIAL

## 2024-03-12 VITALS
DIASTOLIC BLOOD PRESSURE: 70 MMHG | OXYGEN SATURATION: 97 % | WEIGHT: 237.6 LBS | SYSTOLIC BLOOD PRESSURE: 130 MMHG | HEART RATE: 56 BPM | RESPIRATION RATE: 16 BRPM | BODY MASS INDEX: 32.18 KG/M2 | HEIGHT: 72 IN | TEMPERATURE: 98.2 F

## 2024-03-12 DIAGNOSIS — M54.2 NECK PAIN: ICD-10-CM

## 2024-03-12 DIAGNOSIS — Z00.00 WELL ADULT EXAM: Primary | ICD-10-CM

## 2024-03-12 DIAGNOSIS — M25.512 ACUTE PAIN OF LEFT SHOULDER: ICD-10-CM

## 2024-03-12 LAB
EKG ATRIAL RATE: 51 BPM
EKG P AXIS: 33 DEGREES
EKG P-R INTERVAL: 162 MS
EKG Q-T INTERVAL: 440 MS
EKG QRS DURATION: 86 MS
EKG QTC CALCULATION (BAZETT): 405 MS
EKG R AXIS: 37 DEGREES
EKG T AXIS: 30 DEGREES
EKG VENTRICULAR RATE: 51 BPM

## 2024-03-12 PROCEDURE — 93005 ELECTROCARDIOGRAM TRACING: CPT | Performed by: FAMILY MEDICINE

## 2024-03-12 PROCEDURE — 72040 X-RAY EXAM NECK SPINE 2-3 VW: CPT

## 2024-03-12 PROCEDURE — 3078F DIAST BP <80 MM HG: CPT | Performed by: FAMILY MEDICINE

## 2024-03-12 PROCEDURE — 99396 PREV VISIT EST AGE 40-64: CPT | Performed by: FAMILY MEDICINE

## 2024-03-12 PROCEDURE — 3075F SYST BP GE 130 - 139MM HG: CPT | Performed by: FAMILY MEDICINE

## 2024-03-12 PROCEDURE — 93010 ELECTROCARDIOGRAM REPORT: CPT | Performed by: INTERNAL MEDICINE

## 2024-03-12 SDOH — ECONOMIC STABILITY: HOUSING INSECURITY
IN THE LAST 12 MONTHS, WAS THERE A TIME WHEN YOU DID NOT HAVE A STEADY PLACE TO SLEEP OR SLEPT IN A SHELTER (INCLUDING NOW)?: NO

## 2024-03-12 SDOH — ECONOMIC STABILITY: INCOME INSECURITY: HOW HARD IS IT FOR YOU TO PAY FOR THE VERY BASICS LIKE FOOD, HOUSING, MEDICAL CARE, AND HEATING?: NOT HARD AT ALL

## 2024-03-12 SDOH — ECONOMIC STABILITY: FOOD INSECURITY: WITHIN THE PAST 12 MONTHS, THE FOOD YOU BOUGHT JUST DIDN'T LAST AND YOU DIDN'T HAVE MONEY TO GET MORE.: NEVER TRUE

## 2024-03-12 SDOH — ECONOMIC STABILITY: FOOD INSECURITY: WITHIN THE PAST 12 MONTHS, YOU WORRIED THAT YOUR FOOD WOULD RUN OUT BEFORE YOU GOT MONEY TO BUY MORE.: NEVER TRUE

## 2024-03-12 ASSESSMENT — PATIENT HEALTH QUESTIONNAIRE - PHQ9
SUM OF ALL RESPONSES TO PHQ9 QUESTIONS 1 & 2: 0
SUM OF ALL RESPONSES TO PHQ QUESTIONS 1-9: 0
1. LITTLE INTEREST OR PLEASURE IN DOING THINGS: 0
SUM OF ALL RESPONSES TO PHQ QUESTIONS 1-9: 0
2. FEELING DOWN, DEPRESSED OR HOPELESS: 0

## 2024-03-12 ASSESSMENT — ENCOUNTER SYMPTOMS
SORE THROAT: 0
COUGH: 0
RHINORRHEA: 0

## 2024-03-13 DIAGNOSIS — M54.2 NECK PAIN: Primary | ICD-10-CM

## 2024-03-13 DIAGNOSIS — M25.512 ACUTE PAIN OF LEFT SHOULDER: ICD-10-CM

## 2024-04-25 DIAGNOSIS — I10 ESSENTIAL HYPERTENSION: ICD-10-CM

## 2024-04-25 RX ORDER — HYDROCHLOROTHIAZIDE 25 MG/1
25 TABLET ORAL DAILY
Qty: 90 TABLET | Refills: 1 | Status: SHIPPED | OUTPATIENT
Start: 2024-04-25

## 2024-06-04 NOTE — TELEPHONE ENCOUNTER
Group Therapy Note    Date: 6/4/2024    Group Start Time: 12:00 PM  Group End Time: 12:50 PM  Group Topic: Psychotherapy    [facility]    Marina, Larissa S, LCSW        Group Therapy Note    Attendees: 9 scheduled    Focus of session was based on information from book “Own Your Past, Change Your Future” by Dr. Michel Luz.   I shared the strategy to change our actions in order to live a well life.  We spoke about the reasons shared in the book that keep us stuck in old ways because they are comfortable and safe.  We spoke about how our brains crave the familiar and we can equate predictable with safe.  We discussed the excuses we can recognize for not making the change we want and discussed if it is based on fear or something else. We spoke about how we can create a goal for this week to help us get started on letting go of the unhealthy familiar and move on to new ways of being or doing that will lead to long term health and wellness.         Patient's Goal:  1. Dep F 31.81 S.  Pt will verbalize an understanding of the need for a process of forgiveness of others & self to reduce anger and identify who he holds anger towards at this time in his life.”      Notes:  Nato participated in group with prompting.  He was struggling to interact due to his physical pain. He spoke about how he knows that he is irritable and we spoke about what is helping him now versus what is hurting me.      Status After Intervention:  Unchanged    Participation Level: Active Listener and Interactive    Participation Quality: Appropriate, Attentive, Sharing, and Supportive      Speech:  normal      Thought Process/Content: Logical  Linear      Affective Functioning: Congruent      Mood: anxious and depressed      Level of consciousness:  Alert, Oriented x4, and Preoccupied      Response to Learning: Able to verbalize current knowledge/experience, Able to  Michael Hamlin is requesting a refill on the following medications:  Requested Prescriptions     Pending Prescriptions Disp Refills    meloxicam (MOBIC) 15 MG tablet 90 tablet 0     Sig: Take 1 tablet by mouth daily       Date of last visit: 5/24/2021  Date of next visit (if applicable):Visit date not found  Date of last refill: 3/29/2022  Ph[facility] [place]

## 2024-06-17 DIAGNOSIS — I10 ESSENTIAL HYPERTENSION: ICD-10-CM

## 2024-06-17 RX ORDER — LOSARTAN POTASSIUM 50 MG/1
50 TABLET ORAL DAILY
Qty: 90 TABLET | Refills: 1 | Status: SHIPPED | OUTPATIENT
Start: 2024-06-17

## 2024-08-27 ENCOUNTER — HOSPITAL ENCOUNTER (OUTPATIENT)
Age: 59
Discharge: HOME | End: 2024-08-27
Payer: COMMERCIAL

## 2024-08-27 LAB
ANION GAP SERPL CALC-SCNC: 12 MEQ/L (ref 8–16)
BUN SERPL-MCNC: 17 MG/DL (ref 7–22)
CHLORIDE SERPL-SCNC: 99 MEQ/L (ref 98–111)
CO2 SERPL-SCNC: 26 MEQ/L (ref 23–33)
CREAT SERPL-MCNC: 0.9 MG/DL (ref 0.4–1.2)
DEPRECATED RDW RBC AUTO: 38.2 FL (ref 35–45)
EKG ATRIAL RATE: 50 BPM
EKG P AXIS: 34 DEGREES
EKG P-R INTERVAL: 148 MS
EKG Q-T INTERVAL: 448 MS
EKG QRS DURATION: 84 MS
EKG QTC CALCULATION (BAZETT): 408 MS
EKG R AXIS: 37 DEGREES
EKG T AXIS: 30 DEGREES
EKG VENTRICULAR RATE: 50 BPM
ERYTHROCYTE [DISTWIDTH] IN BLOOD BY AUTOMATED COUNT: 11.9 % (ref 11.5–14.5)
GFR SERPL CREATININE-BSD FRML MDRD: > 90 ML/MIN/1.73M2
HCT VFR BLD AUTO: 41.1 % (ref 42–52)
HGB BLD-MCNC: 13.9 GM/DL (ref 14–18)
MCH RBC QN AUTO: 30 PG (ref 26–33)
MCHC RBC AUTO-ENTMCNC: 33.8 GM/DL (ref 32.2–35.5)
MCV RBC AUTO: 88.6 FL (ref 80–94)
PLATELET # BLD AUTO: 281 THOU/MM3 (ref 130–400)
PMV BLD AUTO: 9.5 FL (ref 9.4–12.4)
POTASSIUM SERPL-SCNC: 3.8 MEQ/L (ref 3.5–5.2)
RBC # BLD AUTO: 4.64 MILL/MM3 (ref 4.7–6.1)
SODIUM SERPL-SCNC: 137 MEQ/L (ref 135–145)
WBC # BLD AUTO: 7.7 THOU/MM3 (ref 4.8–10.8)

## 2024-08-27 PROCEDURE — 93005 ELECTROCARDIOGRAM TRACING: CPT | Performed by: ORTHOPAEDIC SURGERY

## 2024-08-27 PROCEDURE — 82565 ASSAY OF CREATININE: CPT

## 2024-08-27 PROCEDURE — 84520 ASSAY OF UREA NITROGEN: CPT

## 2024-08-27 PROCEDURE — 80051 ELECTROLYTE PANEL: CPT

## 2024-08-27 PROCEDURE — 93010 ELECTROCARDIOGRAM REPORT: CPT | Performed by: NUCLEAR MEDICINE

## 2024-08-27 PROCEDURE — 85027 COMPLETE CBC AUTOMATED: CPT

## 2024-08-27 PROCEDURE — 36415 COLL VENOUS BLD VENIPUNCTURE: CPT

## 2024-10-24 DIAGNOSIS — I10 ESSENTIAL HYPERTENSION: ICD-10-CM

## 2024-10-24 RX ORDER — HYDROCHLOROTHIAZIDE 25 MG/1
25 TABLET ORAL DAILY
Qty: 90 TABLET | Refills: 1 | Status: SHIPPED | OUTPATIENT
Start: 2024-10-24

## 2024-10-24 NOTE — TELEPHONE ENCOUNTER
Last visit- 3/12/2024  Next visit- Visit date not found    Requested Prescriptions     Pending Prescriptions Disp Refills    hydroCHLOROthiazide (HYDRODIURIL) 25 MG tablet 90 tablet 1     Sig: Take 1 tablet by mouth daily

## 2024-12-02 RX ORDER — CELECOXIB 200 MG/1
200 CAPSULE ORAL DAILY
Qty: 90 CAPSULE | Refills: 1 | Status: SHIPPED | OUTPATIENT
Start: 2024-12-02

## 2024-12-09 ENCOUNTER — TELEPHONE (OUTPATIENT)
Dept: FAMILY MEDICINE CLINIC | Age: 59
End: 2024-12-09

## 2024-12-09 NOTE — TELEPHONE ENCOUNTER
[employer]    Prior auth initiated through [facility]  Insurance response time is 7-14 business days.

## 2025-01-13 DIAGNOSIS — I10 ESSENTIAL HYPERTENSION: ICD-10-CM

## 2025-01-13 RX ORDER — LOSARTAN POTASSIUM 50 MG/1
TABLET ORAL
Qty: 90 TABLET | Refills: 1 | Status: SHIPPED | OUTPATIENT
Start: 2025-01-13

## 2025-05-06 DIAGNOSIS — I10 ESSENTIAL HYPERTENSION: ICD-10-CM

## 2025-05-06 RX ORDER — HYDROCHLOROTHIAZIDE 25 MG/1
25 TABLET ORAL DAILY
Qty: 90 TABLET | Refills: 1 | Status: SHIPPED | OUTPATIENT
Start: 2025-05-06 | End: 2025-05-08 | Stop reason: SDUPTHER

## 2025-05-08 DIAGNOSIS — I10 ESSENTIAL HYPERTENSION: ICD-10-CM

## 2025-05-08 RX ORDER — HYDROCHLOROTHIAZIDE 25 MG/1
25 TABLET ORAL DAILY
Qty: 90 TABLET | Refills: 1 | Status: SHIPPED | OUTPATIENT
Start: 2025-05-08

## 2025-05-08 RX ORDER — CELECOXIB 200 MG/1
200 CAPSULE ORAL DAILY
Qty: 90 CAPSULE | Refills: 1 | Status: SHIPPED | OUTPATIENT
Start: 2025-05-08

## 2025-06-09 RX ORDER — CELECOXIB 200 MG/1
200 CAPSULE ORAL DAILY
Qty: 90 CAPSULE | Refills: 1 | Status: SHIPPED | OUTPATIENT
Start: 2025-06-09

## 2025-06-30 SDOH — ECONOMIC STABILITY: FOOD INSECURITY: WITHIN THE PAST 12 MONTHS, THE FOOD YOU BOUGHT JUST DIDN'T LAST AND YOU DIDN'T HAVE MONEY TO GET MORE.: NEVER TRUE

## 2025-06-30 SDOH — ECONOMIC STABILITY: INCOME INSECURITY: IN THE LAST 12 MONTHS, WAS THERE A TIME WHEN YOU WERE NOT ABLE TO PAY THE MORTGAGE OR RENT ON TIME?: NO

## 2025-06-30 SDOH — ECONOMIC STABILITY: TRANSPORTATION INSECURITY
IN THE PAST 12 MONTHS, HAS LACK OF TRANSPORTATION KEPT YOU FROM MEETINGS, WORK, OR FROM GETTING THINGS NEEDED FOR DAILY LIVING?: NO

## 2025-06-30 SDOH — ECONOMIC STABILITY: FOOD INSECURITY: WITHIN THE PAST 12 MONTHS, YOU WORRIED THAT YOUR FOOD WOULD RUN OUT BEFORE YOU GOT MONEY TO BUY MORE.: NEVER TRUE

## 2025-06-30 SDOH — ECONOMIC STABILITY: TRANSPORTATION INSECURITY
IN THE PAST 12 MONTHS, HAS THE LACK OF TRANSPORTATION KEPT YOU FROM MEDICAL APPOINTMENTS OR FROM GETTING MEDICATIONS?: NO

## 2025-06-30 NOTE — PROGRESS NOTES
[facility]  [address]  [place] [address]  Phone:  [phone]       2025    TELEHEALTH EVALUATION    HPI:    Jacobo Shoemaker (:  1965) has requested an audio/video evaluation for the following concern(s):    Sometimes when he gets up in the morning he feels like his heart races.  In the afternoon he doesn't have much energy.  This has been the case for the past 3 months.  His BP today was 127/74 and his pulse was 66.  No chest pain or palpitations.  His father  from heart disease in his 50s so he's worried.      He'd like to lose weight but likes to eat and doesn't always eat the right foods.    Review of Systems   Constitutional:  Positive for fatigue.   Respiratory:  Negative for shortness of breath.    Cardiovascular:  Positive for palpitations. Negative for chest pain.       Prior to Visit Medications    Medication Sig Taking? Authorizing Provider   celecoxib (CELEBREX) 200 MG capsule Take 1 capsule by mouth daily  Sury Kelley MD   hydroCHLOROthiazide (HYDRODIURIL) 25 MG tablet Take 1 tablet by mouth daily  Sury Kelley MD   losartan (COZAAR) 50 MG tablet TAKE ONE (1) TABLET ONCE DAILY  Sury Kelley MD   zinc acetate 10 mg/mL oral syrup Take by mouth daily  Sukhdev Dominguez MD   NONFORMULARY Relief factor  Sukhdev Dominguez MD   vitamin B-12 (CYANOCOBALAMIN) 1000 MCG tablet Take 1 tablet by mouth daily  Sukhdev Dominguez MD   Cholecalciferol (D3 PO) Take by mouth  Sukhdev Dominguez MD   Multiple Vitamin (MULTI VITAMIN MENS PO) Take  by mouth.  Sukhdev Dominguez MD   Omega-3 Fatty Acids (FISH OIL) 1200 MG CAPS Take  by mouth daily.    Sukhdev Dominguez MD       Social History     Tobacco Use    Smoking status: Former     Current packs/day: 0.00     Average packs/day: 1.5 packs/day for 10.0 years (15.0 ttl pk-yrs)     Types: Cigarettes     Start date: 2/15/1984     Quit date: 2/15/1994     Years since quittin.3

## 2025-07-02 ENCOUNTER — TELEMEDICINE (OUTPATIENT)
Dept: FAMILY MEDICINE CLINIC | Age: 60
End: 2025-07-02

## 2025-07-02 DIAGNOSIS — Z00.00 HEALTHCARE MAINTENANCE: ICD-10-CM

## 2025-07-02 DIAGNOSIS — R94.31 ABNORMAL ELECTROCARDIOGRAPHY: Primary | ICD-10-CM

## 2025-07-02 DIAGNOSIS — R00.0 TACHYCARDIA: ICD-10-CM

## 2025-07-02 DIAGNOSIS — Z12.5 SCREENING PSA (PROSTATE SPECIFIC ANTIGEN): ICD-10-CM

## 2025-07-02 DIAGNOSIS — R53.82 CHRONIC FATIGUE: ICD-10-CM

## 2025-07-02 ASSESSMENT — ENCOUNTER SYMPTOMS: SHORTNESS OF BREATH: 0

## 2025-07-08 DIAGNOSIS — I10 ESSENTIAL HYPERTENSION: ICD-10-CM

## 2025-07-08 RX ORDER — LOSARTAN POTASSIUM 50 MG/1
TABLET ORAL
Qty: 90 TABLET | Refills: 1 | Status: SHIPPED | OUTPATIENT
Start: 2025-07-08 | End: 2025-07-09

## 2025-07-09 RX ORDER — LOSARTAN POTASSIUM 50 MG/1
TABLET ORAL
Qty: 90 TABLET | Refills: 1 | Status: SHIPPED | OUTPATIENT
Start: 2025-07-09

## 2025-07-14 ENCOUNTER — HOSPITAL ENCOUNTER (OUTPATIENT)
Age: 60
Discharge: HOME | End: 2025-07-14
Payer: COMMERCIAL

## 2025-07-14 ENCOUNTER — HOSPITAL ENCOUNTER (OUTPATIENT)
Age: 60
Discharge: HOME | End: 2025-07-16
Attending: FAMILY MEDICINE
Payer: COMMERCIAL

## 2025-07-14 DIAGNOSIS — R00.0 TACHYCARDIA: ICD-10-CM

## 2025-07-14 DIAGNOSIS — R53.82 CHRONIC FATIGUE: ICD-10-CM

## 2025-07-14 DIAGNOSIS — Z00.00 HEALTHCARE MAINTENANCE: ICD-10-CM

## 2025-07-14 DIAGNOSIS — Z12.5 SCREENING PSA (PROSTATE SPECIFIC ANTIGEN): ICD-10-CM

## 2025-07-14 DIAGNOSIS — R94.31 ABNORMAL ELECTROCARDIOGRAPHY: ICD-10-CM

## 2025-07-14 LAB
25(OH)D3 SERPL-MCNC: 59 NG/ML (ref 30–100)
ALBUMIN SERPL BCG-MCNC: 4.3 G/DL (ref 3.4–4.9)
ALP SERPL-CCNC: 62 U/L (ref 40–129)
ALT SERPL W/O P-5'-P-CCNC: 30 U/L (ref 10–50)
ANION GAP SERPL CALC-SCNC: 14 MEQ/L (ref 8–16)
AST SERPL-CCNC: 28 U/L (ref 10–50)
BASOPHILS ABSOLUTE: 0 THOU/MM3 (ref 0–0.1)
BASOPHILS NFR BLD AUTO: 0.7 %
BILIRUB SERPL-MCNC: 0.8 MG/DL (ref 0.3–1.2)
BUN SERPL-MCNC: 17 MG/DL (ref 8–23)
CALCIUM SERPL-MCNC: 9.7 MG/DL (ref 8.8–10.2)
CHLORIDE SERPL-SCNC: 102 MEQ/L (ref 98–111)
CHOLEST SERPL-MCNC: 187 MG/DL (ref 100–199)
CO2 SERPL-SCNC: 24 MEQ/L (ref 22–29)
CREAT SERPL-MCNC: 1 MG/DL (ref 0.7–1.2)
DEPRECATED RDW RBC AUTO: 40.1 FL (ref 35–45)
EOSINOPHIL NFR BLD AUTO: 4.5 %
EOSINOPHILS ABSOLUTE: 0.2 THOU/MM3 (ref 0–0.4)
ERYTHROCYTE [DISTWIDTH] IN BLOOD BY AUTOMATED COUNT: 12.4 % (ref 11.5–14.5)
FOLATE SERPL-MCNC: > 20 NG/ML (ref 4.6–34.8)
GFR SERPL CREATININE-BSD FRML MDRD: 85 ML/MIN/1.73M2
GLUCOSE SERPL-MCNC: 103 MG/DL (ref 74–109)
HCT VFR BLD AUTO: 45.2 % (ref 42–52)
HDLC SERPL-MCNC: 39 MG/DL
HGB BLD-MCNC: 15.3 GM/DL (ref 14–18)
IMM GRANULOCYTES # BLD AUTO: 0.02 THOU/MM3 (ref 0–0.07)
IMM GRANULOCYTES NFR BLD AUTO: 0.4 %
LDLC SERPL CALC-MCNC: 128 MG/DL
LYMPHOCYTES ABSOLUTE: 0.9 THOU/MM3 (ref 1–4.8)
LYMPHOCYTES NFR BLD AUTO: 17.8 %
MCH RBC QN AUTO: 30.3 PG (ref 26–33)
MCHC RBC AUTO-ENTMCNC: 33.8 GM/DL (ref 32.2–35.5)
MCV RBC AUTO: 89.5 FL (ref 80–94)
MONOCYTES ABSOLUTE: 0.9 THOU/MM3 (ref 0.4–1.3)
MONOCYTES NFR BLD AUTO: 17.2 %
NEUTROPHILS ABSOLUTE: 3.1 THOU/MM3 (ref 1.8–7.7)
NEUTROPHILS NFR BLD AUTO: 59.4 %
NRBC BLD AUTO-RTO: 0 /100 WBC
PLATELET # BLD AUTO: 245 THOU/MM3 (ref 130–400)
PMV BLD AUTO: 10.2 FL (ref 9.4–12.4)
POTASSIUM SERPL-SCNC: 4 MEQ/L (ref 3.5–5.2)
PROT SERPL-MCNC: 6.8 G/DL (ref 6.4–8.3)
PSA SERPL-MCNC: 0.44 NG/ML (ref 0–1)
RBC # BLD AUTO: 5.05 MILL/MM3 (ref 4.7–6.1)
SODIUM SERPL-SCNC: 140 MEQ/L (ref 135–145)
STRESS BASELINE DIAS BP: 94 MMHG
STRESS BASELINE HR: 60 BPM
STRESS BASELINE SYS BP: 135 MMHG
STRESS ESTIMATED WORKLOAD: 9.8 METS
STRESS PEAK DIAS BP: 69 MMHG
STRESS PEAK SYS BP: 176 MMHG
STRESS PERCENT HR ACHIEVED: 86 %
STRESS POST PEAK HR: 136 BPM
STRESS RATE PRESSURE PRODUCT: NORMAL BPM*MMHG
STRESS STAGE 1 BP: NORMAL MMHG
STRESS STAGE 1 DURATION: 3 MIN:SEC
STRESS STAGE 1 HR: 96 BPM
STRESS STAGE 2 BP: NORMAL MMHG
STRESS STAGE 2 DURATION: 3 MIN:SEC
STRESS STAGE 2 HR: 117 BPM
STRESS STAGE 3 DURATION: 2 MIN:SEC
STRESS STAGE 3 HR: 136 BPM
STRESS STAGE RECOVERY 1 BP: NORMAL MMHG
STRESS STAGE RECOVERY 1 DURATION: 1 MIN:SEC
STRESS STAGE RECOVERY 1 HR: 107 BPM
STRESS STAGE RECOVERY 2 BP: NORMAL MMHG
STRESS STAGE RECOVERY 2 DURATION: 1 MIN:SEC
STRESS STAGE RECOVERY 2 HR: 91 BPM
STRESS STAGE RECOVERY 3 BP: NORMAL MMHG
STRESS STAGE RECOVERY 3 DURATION: 3 MIN:SEC
STRESS STAGE RECOVERY 3 HR: 71 BPM
STRESS TARGET HR: 159 BPM
TESTOST SERPL-MCNC: 406 NG/DL (ref 193–740)
TRIGL SERPL-MCNC: 102 MG/DL (ref 0–199)
TSH SERPL DL<=0.05 MIU/L-ACNC: 3.61 UIU/ML (ref 0.27–4.2)
URATE SERPL-MCNC: 6.8 MG/DL (ref 3.7–7)
VIT B12 SERPL-MCNC: 590 PG/ML (ref 232–1245)
WBC # BLD AUTO: 5.3 THOU/MM3 (ref 4.8–10.8)

## 2025-07-14 PROCEDURE — 84403 ASSAY OF TOTAL TESTOSTERONE: CPT

## 2025-07-14 PROCEDURE — 93016 CV STRESS TEST SUPVJ ONLY: CPT | Performed by: NUCLEAR MEDICINE

## 2025-07-14 PROCEDURE — 93018 CV STRESS TEST I&R ONLY: CPT | Performed by: NUCLEAR MEDICINE

## 2025-07-14 PROCEDURE — 36415 COLL VENOUS BLD VENIPUNCTURE: CPT

## 2025-07-14 PROCEDURE — 80061 LIPID PANEL: CPT

## 2025-07-14 PROCEDURE — 84550 ASSAY OF BLOOD/URIC ACID: CPT

## 2025-07-14 PROCEDURE — 82306 VITAMIN D 25 HYDROXY: CPT

## 2025-07-14 PROCEDURE — 84443 ASSAY THYROID STIM HORMONE: CPT

## 2025-07-14 PROCEDURE — 80053 COMPREHEN METABOLIC PANEL: CPT

## 2025-07-14 PROCEDURE — 93017 CV STRESS TEST TRACING ONLY: CPT

## 2025-07-14 PROCEDURE — 82607 VITAMIN B-12: CPT

## 2025-07-14 PROCEDURE — 85025 COMPLETE CBC W/AUTO DIFF WBC: CPT

## 2025-07-14 PROCEDURE — G0103 PSA SCREENING: HCPCS

## 2025-07-14 PROCEDURE — 82746 ASSAY OF FOLIC ACID SERUM: CPT

## 2025-08-18 ENCOUNTER — HOSPITAL ENCOUNTER (OUTPATIENT)
Age: 60
Discharge: HOME | End: 2025-08-20
Attending: FAMILY MEDICINE
Payer: COMMERCIAL

## 2025-08-18 VITALS
DIASTOLIC BLOOD PRESSURE: 70 MMHG | SYSTOLIC BLOOD PRESSURE: 130 MMHG | WEIGHT: 237 LBS | BODY MASS INDEX: 32.1 KG/M2 | HEIGHT: 72 IN

## 2025-08-18 DIAGNOSIS — R94.31 ABNORMAL ELECTROCARDIOGRAPHY: ICD-10-CM

## 2025-08-18 DIAGNOSIS — R00.0 TACHYCARDIA: ICD-10-CM

## 2025-08-18 LAB
ECHO AO ASC DIAM: 3.1 CM
ECHO AO ASCENDING AORTA INDEX: 1.35 CM/M2
ECHO AV CUSP MM: 1.7 CM
ECHO AV MEAN GRADIENT: 3 MMHG
ECHO AV MEAN VELOCITY: 0.8 M/S
ECHO AV PEAK GRADIENT: 6 MMHG
ECHO AV PEAK VELOCITY: 1.2 M/S
ECHO AV VELOCITY RATIO: 1.33
ECHO AV VTI: 27.4 CM
ECHO BSA: 2.34 M2
ECHO EST RA PRESSURE: 3 MMHG
ECHO LA AREA 2C: 17.9 CM2
ECHO LA AREA 4C: 14.7 CM2
ECHO LA DIAMETER INDEX: 1.79 CM/M2
ECHO LA DIAMETER: 4.1 CM
ECHO LA MAJOR AXIS: 5 CM
ECHO LA MINOR AXIS: 5.7 CM
ECHO LA VOL BP: 43 ML (ref 18–58)
ECHO LA VOL MOD A2C: 46 ML (ref 18–58)
ECHO LA VOL MOD A4C: 36 ML (ref 18–58)
ECHO LA VOL/BSA BIPLANE: 19 ML/M2 (ref 16–34)
ECHO LA VOLUME INDEX MOD A2C: 20 ML/M2 (ref 16–34)
ECHO LA VOLUME INDEX MOD A4C: 16 ML/M2 (ref 16–34)
ECHO LV E' LATERAL VELOCITY: 11.6 CM/S
ECHO LV E' SEPTAL VELOCITY: 9 CM/S
ECHO LV EF PHYSICIAN: 60 %
ECHO LV FRACTIONAL SHORTENING: 32 % (ref 28–44)
ECHO LV INTERNAL DIMENSION DIASTOLE INDEX: 2.18 CM/M2
ECHO LV INTERNAL DIMENSION DIASTOLIC: 5 CM (ref 4.2–5.9)
ECHO LV INTERNAL DIMENSION SYSTOLIC INDEX: 1.48 CM/M2
ECHO LV INTERNAL DIMENSION SYSTOLIC: 3.4 CM
ECHO LV ISOVOLUMETRIC RELAXATION TIME (IVRT): 74 MS
ECHO LV IVSD: 1.4 CM (ref 0.6–1)
ECHO LV MASS 2D: 247.6 G (ref 88–224)
ECHO LV MASS INDEX 2D: 108.1 G/M2 (ref 49–115)
ECHO LV POSTERIOR WALL DIASTOLIC: 1.1 CM (ref 0.6–1)
ECHO LV RELATIVE WALL THICKNESS RATIO: 0.44
ECHO LVOT AV VTI INDEX: 1.13
ECHO LVOT MEAN GRADIENT: 4 MMHG
ECHO LVOT PEAK GRADIENT: 11 MMHG
ECHO LVOT PEAK VELOCITY: 1.6 M/S
ECHO LVOT VTI: 31 CM
ECHO MV A VELOCITY: 0.71 M/S
ECHO MV E DECELERATION TIME (DT): 309 MS
ECHO MV E VELOCITY: 1.01 M/S
ECHO MV E/A RATIO: 1.42
ECHO MV E/E' LATERAL: 8.71
ECHO MV E/E' RATIO (AVERAGED): 9.96
ECHO MV E/E' SEPTAL: 11.22
ECHO MV REGURGITANT PEAK GRADIENT: 81 MMHG
ECHO MV REGURGITANT PEAK VELOCITY: 4.5 M/S
ECHO PULMONARY ARTERY END DIASTOLIC PRESSURE: 2 MMHG
ECHO PV MAX VELOCITY: 0.9 M/S
ECHO PV PEAK GRADIENT: 3 MMHG
ECHO PV REGURGITANT MAX VELOCITY: 0.8 M/S
ECHO RIGHT VENTRICULAR SYSTOLIC PRESSURE (RVSP): 33 MMHG
ECHO RV INTERNAL DIMENSION: 3.6 CM
ECHO RV TAPSE: 2.7 CM (ref 1.7–?)
ECHO TV E WAVE: 0.7 M/S
ECHO TV REGURGITANT MAX VELOCITY: 2.74 M/S
ECHO TV REGURGITANT PEAK GRADIENT: 30 MMHG

## 2025-08-18 PROCEDURE — 93306 TTE W/DOPPLER COMPLETE: CPT | Performed by: INTERNAL MEDICINE

## 2025-08-18 PROCEDURE — 93306 TTE W/DOPPLER COMPLETE: CPT
